# Patient Record
Sex: MALE | Race: WHITE | NOT HISPANIC OR LATINO
[De-identification: names, ages, dates, MRNs, and addresses within clinical notes are randomized per-mention and may not be internally consistent; named-entity substitution may affect disease eponyms.]

---

## 2018-01-29 ENCOUNTER — APPOINTMENT (OUTPATIENT)
Dept: HEART AND VASCULAR | Facility: CLINIC | Age: 43
End: 2018-01-29
Payer: COMMERCIAL

## 2018-01-29 VITALS
HEART RATE: 46 BPM | DIASTOLIC BLOOD PRESSURE: 61 MMHG | HEIGHT: 70 IN | SYSTOLIC BLOOD PRESSURE: 121 MMHG | WEIGHT: 230 LBS | BODY MASS INDEX: 32.93 KG/M2

## 2018-01-29 PROCEDURE — 93000 ELECTROCARDIOGRAM COMPLETE: CPT

## 2018-01-29 PROCEDURE — 99214 OFFICE O/P EST MOD 30 MIN: CPT | Mod: 25

## 2018-01-29 RX ORDER — ALBUTEROL SULFATE 90 UG/1
108 (90 BASE) AEROSOL, METERED RESPIRATORY (INHALATION)
Qty: 18 | Refills: 0 | Status: COMPLETED | COMMUNITY
Start: 2017-08-09

## 2018-01-29 RX ORDER — NAPROXEN 500 MG/1
500 TABLET, DELAYED RELEASE ORAL
Qty: 60 | Refills: 0 | Status: COMPLETED | COMMUNITY
Start: 2017-01-11

## 2018-01-29 RX ORDER — NAPROXEN SODIUM 220 MG
TABLET ORAL
Refills: 0 | Status: ACTIVE | COMMUNITY

## 2018-01-29 RX ORDER — CYCLOBENZAPRINE HYDROCHLORIDE 10 MG/1
10 TABLET, FILM COATED ORAL
Qty: 10 | Refills: 0 | Status: COMPLETED | COMMUNITY
Start: 2017-09-02

## 2018-01-29 RX ORDER — SULFAMETHOXAZOLE AND TRIMETHOPRIM 800; 160 MG/1; MG/1
800-160 TABLET ORAL
Qty: 28 | Refills: 0 | Status: COMPLETED | COMMUNITY
Start: 2017-08-10

## 2018-01-29 RX ORDER — MOMETASONE FUROATE MONOHYDRATE 50 UG/1
SPRAY, METERED NASAL
Refills: 0 | Status: ACTIVE | COMMUNITY

## 2018-01-29 RX ORDER — CLINDAMYCIN PHOSPHATE 10 MG/ML
1 LOTION TOPICAL
Qty: 60 | Refills: 0 | Status: COMPLETED | COMMUNITY
Start: 2017-01-08

## 2018-01-29 RX ORDER — ACETAMINOPHEN AND CODEINE 300; 30 MG/1; MG/1
300-30 TABLET ORAL
Qty: 20 | Refills: 0 | Status: COMPLETED | COMMUNITY
Start: 2017-09-02

## 2018-01-29 RX ORDER — TADALAFIL 10 MG/1
10 TABLET, FILM COATED ORAL
Qty: 5 | Refills: 0 | Status: COMPLETED | COMMUNITY
Start: 2017-12-14

## 2018-01-29 RX ORDER — CYCLOBENZAPRINE HYDROCHLORIDE 5 MG/1
5 TABLET, FILM COATED ORAL
Qty: 30 | Refills: 0 | Status: COMPLETED | COMMUNITY
Start: 2017-06-02

## 2018-01-29 RX ORDER — TADALAFIL 5 MG/1
5 TABLET, FILM COATED ORAL
Qty: 5 | Refills: 0 | Status: ACTIVE | COMMUNITY
Start: 2016-12-08

## 2018-05-21 ENCOUNTER — APPOINTMENT (OUTPATIENT)
Dept: HEART AND VASCULAR | Facility: CLINIC | Age: 43
End: 2018-05-21
Payer: COMMERCIAL

## 2018-05-21 VITALS
DIASTOLIC BLOOD PRESSURE: 56 MMHG | WEIGHT: 230 LBS | HEIGHT: 70 IN | SYSTOLIC BLOOD PRESSURE: 129 MMHG | HEART RATE: 78 BPM | BODY MASS INDEX: 32.93 KG/M2

## 2018-05-21 DIAGNOSIS — I47.1 SUPRAVENTRICULAR TACHYCARDIA: ICD-10-CM

## 2018-05-21 PROCEDURE — 93000 ELECTROCARDIOGRAM COMPLETE: CPT

## 2018-05-21 PROCEDURE — 99214 OFFICE O/P EST MOD 30 MIN: CPT | Mod: 25

## 2018-05-23 PROBLEM — I47.1 ATRIAL TACHYCARDIA, PAROXYSMAL: Status: ACTIVE | Noted: 2018-05-23

## 2018-06-08 ENCOUNTER — MEDICATION RENEWAL (OUTPATIENT)
Age: 43
End: 2018-06-08

## 2018-06-13 ENCOUNTER — MEDICATION RENEWAL (OUTPATIENT)
Age: 43
End: 2018-06-13

## 2018-07-23 ENCOUNTER — APPOINTMENT (OUTPATIENT)
Dept: SLEEP CENTER | Facility: HOSPITAL | Age: 43
End: 2018-07-23

## 2018-07-23 ENCOUNTER — OUTPATIENT (OUTPATIENT)
Dept: OUTPATIENT SERVICES | Facility: HOSPITAL | Age: 43
LOS: 1 days | End: 2018-07-23
Payer: COMMERCIAL

## 2018-07-23 DIAGNOSIS — G47.33 OBSTRUCTIVE SLEEP APNEA (ADULT) (PEDIATRIC): ICD-10-CM

## 2018-07-23 PROCEDURE — 95810 POLYSOM 6/> YRS 4/> PARAM: CPT | Mod: 26,GC

## 2018-07-23 PROCEDURE — 95810 POLYSOM 6/> YRS 4/> PARAM: CPT

## 2018-08-16 ENCOUNTER — APPOINTMENT (OUTPATIENT)
Dept: PULMONOLOGY | Facility: CLINIC | Age: 43
End: 2018-08-16
Payer: COMMERCIAL

## 2018-08-16 VITALS
HEIGHT: 70 IN | HEART RATE: 67 BPM | BODY MASS INDEX: 32.5 KG/M2 | DIASTOLIC BLOOD PRESSURE: 90 MMHG | WEIGHT: 227 LBS | RESPIRATION RATE: 12 BRPM | SYSTOLIC BLOOD PRESSURE: 120 MMHG | OXYGEN SATURATION: 98 % | TEMPERATURE: 98.76 F

## 2018-08-16 DIAGNOSIS — M17.10 UNILATERAL PRIMARY OSTEOARTHRITIS, UNSPECIFIED KNEE: ICD-10-CM

## 2018-08-16 PROCEDURE — 99243 OFF/OP CNSLTJ NEW/EST LOW 30: CPT | Mod: 25

## 2018-08-16 PROCEDURE — 94010 BREATHING CAPACITY TEST: CPT

## 2018-11-05 ENCOUNTER — APPOINTMENT (OUTPATIENT)
Dept: SLEEP CENTER | Facility: HOME HEALTH | Age: 43
End: 2018-11-05
Payer: COMMERCIAL

## 2018-11-05 ENCOUNTER — OUTPATIENT (OUTPATIENT)
Dept: OUTPATIENT SERVICES | Facility: HOSPITAL | Age: 43
LOS: 1 days | End: 2018-11-05
Payer: COMMERCIAL

## 2018-11-05 PROCEDURE — 95800 SLP STDY UNATTENDED: CPT

## 2018-11-12 DIAGNOSIS — G47.33 OBSTRUCTIVE SLEEP APNEA (ADULT) (PEDIATRIC): ICD-10-CM

## 2018-11-13 ENCOUNTER — APPOINTMENT (OUTPATIENT)
Dept: PULMONOLOGY | Facility: CLINIC | Age: 43
End: 2018-11-13
Payer: COMMERCIAL

## 2018-11-13 VITALS
DIASTOLIC BLOOD PRESSURE: 90 MMHG | OXYGEN SATURATION: 98 % | TEMPERATURE: 98.7 F | HEART RATE: 67 BPM | HEIGHT: 70 IN | WEIGHT: 231 LBS | SYSTOLIC BLOOD PRESSURE: 132 MMHG | BODY MASS INDEX: 33.07 KG/M2

## 2018-11-13 PROCEDURE — 99215 OFFICE O/P EST HI 40 MIN: CPT

## 2018-11-13 RX ORDER — NYSTATIN 100000 U/G
100000 OINTMENT TOPICAL
Qty: 30 | Refills: 0 | Status: COMPLETED | COMMUNITY
Start: 2018-06-19

## 2019-03-05 ENCOUNTER — APPOINTMENT (OUTPATIENT)
Dept: PULMONOLOGY | Facility: CLINIC | Age: 44
End: 2019-03-05

## 2019-11-18 ENCOUNTER — RX RENEWAL (OUTPATIENT)
Age: 44
End: 2019-11-18

## 2019-11-25 ENCOUNTER — NON-APPOINTMENT (OUTPATIENT)
Age: 44
End: 2019-11-25

## 2019-11-25 ENCOUNTER — APPOINTMENT (OUTPATIENT)
Dept: HEART AND VASCULAR | Facility: CLINIC | Age: 44
End: 2019-11-25
Payer: COMMERCIAL

## 2019-11-25 VITALS
DIASTOLIC BLOOD PRESSURE: 82 MMHG | WEIGHT: 238 LBS | SYSTOLIC BLOOD PRESSURE: 136 MMHG | HEART RATE: 72 BPM | HEIGHT: 70 IN | BODY MASS INDEX: 34.07 KG/M2

## 2019-11-25 PROCEDURE — 99214 OFFICE O/P EST MOD 30 MIN: CPT | Mod: 25

## 2019-11-25 PROCEDURE — 93000 ELECTROCARDIOGRAM COMPLETE: CPT

## 2019-11-25 RX ORDER — MELOXICAM 7.5 MG/1
7.5 TABLET ORAL
Qty: 60 | Refills: 0 | Status: DISCONTINUED | COMMUNITY
Start: 2017-11-03 | End: 2019-11-25

## 2019-11-25 RX ORDER — ZOLPIDEM TARTRATE 10 MG/1
10 TABLET ORAL
Qty: 30 | Refills: 0 | Status: DISCONTINUED | COMMUNITY
Start: 2018-08-21 | End: 2019-11-25

## 2019-11-25 RX ORDER — LORATADINE 5 MG/5 ML
SOLUTION, ORAL ORAL
Refills: 0 | Status: ACTIVE | COMMUNITY

## 2019-11-25 RX ORDER — NORTRIPTYLINE HYDROCHLORIDE 10 MG/1
10 CAPSULE ORAL
Qty: 30 | Refills: 0 | Status: DISCONTINUED | COMMUNITY
Start: 2017-12-28 | End: 2019-11-25

## 2019-11-25 RX ORDER — SERTRALINE 25 MG/1
25 TABLET, FILM COATED ORAL
Qty: 30 | Refills: 0 | Status: DISCONTINUED | COMMUNITY
Start: 2018-10-30 | End: 2019-11-25

## 2019-11-25 RX ORDER — ASPIRIN 325 MG/1
325 TABLET, FILM COATED ORAL
Refills: 0 | Status: ACTIVE | COMMUNITY

## 2019-11-25 RX ORDER — METOPROLOL SUCCINATE 50 MG/1
50 TABLET, EXTENDED RELEASE ORAL
Qty: 30 | Refills: 0 | Status: DISCONTINUED | OUTPATIENT
Start: 2017-11-24 | End: 2019-11-25

## 2019-11-25 RX ORDER — CHLORHEXIDINE GLUCONATE 4 %
LIQUID (ML) TOPICAL
Refills: 0 | Status: ACTIVE | COMMUNITY

## 2019-11-25 NOTE — PHYSICAL EXAM
[General Appearance - Well Developed] : well developed [Normal Appearance] : normal appearance [Well Groomed] : well groomed [General Appearance - Well Nourished] : well nourished [No Deformities] : no deformities [General Appearance - In No Acute Distress] : no acute distress [Normal Conjunctiva] : the conjunctiva exhibited no abnormalities [Normal Oral Mucosa] : normal oral mucosa [No Oral Pallor] : no oral pallor [Eyelids - No Xanthelasma] : the eyelids demonstrated no xanthelasmas [Normal Jugular Venous A Waves Present] : normal jugular venous A waves present [No Oral Cyanosis] : no oral cyanosis [Normal Jugular Venous V Waves Present] : normal jugular venous V waves present [No Jugular Venous Garcia A Waves] : no jugular venous garcia A waves [Exaggerated Use Of Accessory Muscles For Inspiration] : no accessory muscle use [Respiration, Rhythm And Depth] : normal respiratory rhythm and effort [Auscultation Breath Sounds / Voice Sounds] : lungs were clear to auscultation bilaterally [Heart Rate And Rhythm] : heart rate and rhythm were normal [Heart Sounds] : normal S1 and S2 [Abdomen Soft] : soft [Murmurs] : no murmurs present [Abnormal Walk] : normal gait [Abdomen Tenderness] : non-tender [Abdomen Mass (___ Cm)] : no abdominal mass palpated [Gait - Sufficient For Exercise Testing] : the gait was sufficient for exercise testing [Nail Clubbing] : no clubbing of the fingernails [Cyanosis, Localized] : no localized cyanosis [Skin Turgor] : normal skin turgor [] : no ischemic changes [Skin Color & Pigmentation] : normal skin color and pigmentation [No Skin Ulcers] : no skin ulcer [No Venous Stasis] : no venous stasis [Skin Lesions] : no skin lesions [Oriented To Time, Place, And Person] : oriented to person, place, and time [No Xanthoma] : no  xanthoma was observed [Affect] : the affect was normal [Mood] : the mood was normal [No Anxiety] : not feeling anxious

## 2019-11-26 ENCOUNTER — RX RENEWAL (OUTPATIENT)
Age: 44
End: 2019-11-26

## 2019-11-27 ENCOUNTER — RX RENEWAL (OUTPATIENT)
Age: 44
End: 2019-11-27

## 2019-11-27 NOTE — DISCUSSION/SUMMARY
[FreeTextEntry1] : Mr. Rene is a 43 year-old gentleman with palpitations and PAT on prior ambulatory telemetry.  EKG today significant for NSR.  Palpitations have been well controlled with Metoprolol XL 50 mg. However, given complaint of side effects, he is advised to trial Diltiazem 240 mg daily.   There are no contraindications from an Electrophysiology perspective to his upcoming shoulder surgery.  Referred to general cardiology for clearance prior to surgery.  He will return for follow-up in 6 months or sooner as needed and knows to call with any questions or concerns in the interim.\par \par

## 2019-11-27 NOTE — HISTORY OF PRESENT ILLNESS
[FreeTextEntry1] : Mr. Rene is a pleasant 43 year-old gentleman with a past medical history significant for Asthma, low testosterone and palpitations who presents for follow-up today.  \par \par He reports onset of palpitations ~2014 which have been brief in nature.  He had a sustained episode of palpitations in May 2016 with associated chest discomfort and SOB.  He was admitted to [facility] and was DANIELLA, TTE showed EF 55-60%, mild LA enlargement, minimal MR. ETT significant for 84% MPHR, no evidence for ischemia. He completed a 30 day event monitor (5/27/16 - 7/2/16) with Dr. Church significant for SR/ST, PACs, and PAT. Full disclosure of atrial runs not available. \par \par Sleep study 11/2018 negative for IVÁN\par \par He notes breakthrough palpitations when he doesn't take Metoprolol.  Often begin when he lays down to sleep; lasts up to 30 minutes after he remembers to take Metoprolol.  No associated CP, SOB, dizziness, near syncope. He does not feel limited in exertional capacity. He does, however, complain of impotence and hair loss.  \par \par He is requesting clearance for rotator cuff surgery 2/2019. 
Follow up with pediatrician in 1-2 days

## 2019-12-25 ENCOUNTER — APPOINTMENT (OUTPATIENT)
Dept: HEART AND VASCULAR | Facility: CLINIC | Age: 44
End: 2019-12-25
Payer: COMMERCIAL

## 2019-12-25 PROCEDURE — 0298T: CPT

## 2020-01-21 ENCOUNTER — CHART COPY (OUTPATIENT)
Age: 45
End: 2020-01-21

## 2020-01-21 ENCOUNTER — APPOINTMENT (OUTPATIENT)
Dept: PULMONOLOGY | Facility: CLINIC | Age: 45
End: 2020-01-21
Payer: COMMERCIAL

## 2020-01-21 ENCOUNTER — RESULT REVIEW (OUTPATIENT)
Age: 45
End: 2020-01-21

## 2020-01-21 ENCOUNTER — MESSAGE (OUTPATIENT)
Age: 45
End: 2020-01-21

## 2020-01-21 VITALS
BODY MASS INDEX: 33.36 KG/M2 | SYSTOLIC BLOOD PRESSURE: 120 MMHG | HEIGHT: 70 IN | HEART RATE: 92 BPM | TEMPERATURE: 98.4 F | OXYGEN SATURATION: 96 % | WEIGHT: 233 LBS | DIASTOLIC BLOOD PRESSURE: 80 MMHG

## 2020-01-21 DIAGNOSIS — G47.33 OBSTRUCTIVE SLEEP APNEA (ADULT) (PEDIATRIC): ICD-10-CM

## 2020-01-21 DIAGNOSIS — R00.2 PALPITATIONS: ICD-10-CM

## 2020-01-21 DIAGNOSIS — Z01.818 ENCOUNTER FOR OTHER PREPROCEDURAL EXAMINATION: ICD-10-CM

## 2020-01-21 PROCEDURE — 99215 OFFICE O/P EST HI 40 MIN: CPT | Mod: 25

## 2020-01-21 PROCEDURE — 95012 NITRIC OXIDE EXP GAS DETER: CPT

## 2020-01-21 PROCEDURE — 94060 EVALUATION OF WHEEZING: CPT

## 2020-01-23 NOTE — REVIEW OF SYSTEMS
[Nasal Congestion] : nasal congestion [As Noted in HPI] : as noted in HPI [Negative] : Endocrine [Cough] : no cough [Dyspnea] : no dyspnea [Chest Tightness] : no chest tightness [Wheezing] : no wheezing [FreeTextEntry9] : paroxysmal atrial tachycardia

## 2020-01-23 NOTE — PROCEDURE
[FreeTextEntry1] : Spirometry and NIOX 1/21/20:\par FVC: 5.49 L (112%) --> 5.52 L (113%)\par FEV1: 3.84 L (96%) --> 4.00 L (100%)\par FEV1/FVC: 70% --> 73%\par QMJ82-33%: 2.56 L/s (62%) --> 2.94 L/s (71%)\par FeNO: 22 ppb\par Mild obstructive defect. Normal FeNO.\par \par Home sleep test 11/5/18: slept 6 hours, AHI 4.7, min SpO2 91%\par \par Andrez 8/16/18:\par FVC: 5.63 L (112%)\par FEV1: 4.28 L (104%)\par FEV1/FVC: 76%\par UGA08-33%: 3.59 L/s (84%)\par No obstruction. FVC normal, restriction unlikely.

## 2020-01-23 NOTE — HISTORY OF PRESENT ILLNESS
[Cough] : denies coughing [Chest Pain Or Discomfort] : denies chest pain [Fever] : denies fever [Difficulty Breathing During Exertion] : stable dyspnea on exertion [Feelings Of Weakness On Exertion] : stable exercise intolerance [FreeTextEntry1] : Pt is 45 yo M with PMH asthma, low testosterone, paroxymal atrial tachycardia on metoprolol. \par \par Referred by Dr. Evelyn Baird for evaluation of IVÁN as pt has noted headaches, daytime fatigue, and impotence.  Treatment of IVÁN if present will likely lessen arrhythmia burden. \par \par Initial intake 8/16/18: Pt has never had sleep testing done in the past. Occasionally will wake up with headaches. Has difficulty with sleep initiation.  Wakes up a few times each night.  He does snore at night. Does not wake feeling refreshed. He avoid napping but does have daytime somnolence. He sleeps roughly 3-4 hours per night. \par He states 2-3 times total in the past few years he has "acid indigestion" and wakes up choking and not able to breathe. \par [place] sleepiness score today: 7 (average sleepiness) \par \par Asthma diagnosed in his late teens. Maintained on Advair 250-50 1 puff once daily; has been on this inhaler for "a couple years."  Was on other inhalers prior to that.  Also on Nasonex. Uses albuterol inhaler only rarely, more when he gets sick or when he works out.  He may use nebs and Spiriva when he gets sick. He develops bronchitis about 3-4 times per year. \par He is allergic to eggs and felt poorly after getting a flu shot in the past, perhaps 10 or more years. \par ****\par \par 1/21/20:\par He continues on Advair 250-50 1 puff once daily. \par He feels that he can go up 10 flights of stairs before getting winded at a normal pace. \par Will get short of breath and chest tightness almost immediately if he runs. \par He feels asthma is stable as compared to his last visit. \par He may use albuterol about 2 times per month. \par No hospitalizations or ED visits for asthma in several years. \par He falls asleep around 1:30 or 2 am and wakes 6 am during the week; this is due primarily to his schedule, not difficulty with sleep maintenance or initiation. \par Continues on Nasonex.\par Denies fever, chest pain, cough. \par He received flublok in 10/2019.

## 2020-01-23 NOTE — PHYSICAL EXAM
[General Appearance - Well Developed] : well developed [Normal Appearance] : normal appearance [General Appearance - In No Acute Distress] : no acute distress [Well Groomed] : well groomed [IV] : IV [Neck Circumference: ___] : neck circumference is [unfilled] [Heart Rate And Rhythm] : heart rate and rhythm were normal [Heart Sounds] : normal S1 and S2 [Murmurs] : no murmurs present [Abnormal Walk] : normal gait [Nail Clubbing] : no clubbing of the fingernails [No Focal Deficits] : no focal deficits [Oriented To Time, Place, And Person] : oriented to person, place, and time [Impaired Insight] : insight and judgment were intact [Memory Recent] : recent memory was not impaired [Affect] : the affect was normal [Respiration, Rhythm And Depth] : normal respiratory rhythm and effort [Exaggerated Use Of Accessory Muscles For Inspiration] : no accessory muscle use [Auscultation Breath Sounds / Voice Sounds] : lungs were clear to auscultation bilaterally [Abdomen Soft] : soft [Abdomen Tenderness] : non-tender [Gait - Sufficient For Exercise Testing] : the gait was sufficient for exercise testing [Skin Color & Pigmentation] : normal skin color and pigmentation [] : no rash [FreeTextEntry1] : mild discoloration of the nails, mild palmar erythema [FreeTextEntry2] : no pedal edema

## 2020-01-23 NOTE — ASSESSMENT
[FreeTextEntry1] : 1/21/20\par It was a pleasure to meet Reji in follow up today.   His respiratory issues are summarized:\par \par 1.  Asthma \par Based upon the history, I suspect that Reji has mild intermittent asthma provoked by exercise and when his allergies are flaring. His symptoms also get significantly worse when he develops a respiratory tract infection.  Currently, he feels his asthma is stable. He is maintained on Advair 250-50 one puff once daily. He rinses out his mouth after each use as instructed. He uses is albuterol inhaler roughly 2 times per month. \par On spirometry today, there is a mild obstructive defect (FEV1/FVC 70%) and his FeNO is normal.  However, it should be noted that his FEV1 today is 440 ccs less than this last spirometry; also his MGY03-52% is approximately 1 L/s less than on his last visit and shows a significant bronchodilator response. For this reason, I would like to have Reji continue his Advair at 1 puff once daily (we will make specific perioperative recommendations below). We will reassess his spirometry and symptoms at his next visit. \par \par 2.   At risk for sleep apnea \par Reji has a collar size of 17", a Mallampati score of 4, and a BMI of approximately 33.  A home sleep study was performed 11/5/18.  Reji's AHI was 4.7 (normal) and his minimum SpO2 was 91% (i.e., normal study). \par \par Today, his [place] sleepiness score is 4 (normal sleepiness) and is the same as his prior visit. His weight is fairly stable as compared to his last visit (231 lb in 11/2018; 233 lb today). He denies morning headaches and drowsiness during the day. He does only sleep 4 or so hours per night on the weekdays, but states this is due to his schedule, not due to any sleep disturbances.   \par \par As he does not have any increase in symptoms and his weight has not changed significantly since his last sleep study was performed, there is no need to re-test for IVÁN at this time. \par \par 3. Recurrent nasal sinus infections. \par It is possible that the patient's deviated nasal septum may be contributing to the recurrent nasal sinus infections. He had been referred to an ENT at his last visit.\par Today, he complains of allergies and postnasal drip. We have advised that he use saline sinus rinse BID in addition to his Nasonex. \par \par 4. Surgical clearance for left rotator cuff tear\par His surgery is scheduled for 2/10/20 with Dr. Ashlyn Jerome at hospitals. \par As noted above, he was evaluated for possible IVÁN and his home sleep study in 11/2018 was negative. On spirometry today, the FEV1 is less as is his UHV35-77%. \par \par Patient is cleared from the pulmonary perspective with the following recommendations: \par -We have requested that he increase the Advair 250-50 to 1 puff BID for the 2 weeks before his surgery and 2 week post-op.  Then, if stable, he will decrease back down to his current dosage of 1 puff once daily until we see him again in clinic. \par -While his sleep study was negative for IVÁN, if there is any sign of upper airways obstruction post- extubation, we recommend that CPAP be started. \par -Intra- and post-operative SpO2 monitoring\par -DVT prophylaxis as per surgical team \par -Incentive spirometry night before the surgery to continue for several days after surgery\par \par \par 4.   Health maintenance \par He is allergic to eggs. He received Flublok (developed from non-egg sources) approximately 10/2019 and is up-to-date for the current flu season. \par There is a mild discoloration of the nails noted on exam today; he is getting a CBC with his PMD later today which will assess his Hgb and hematocrit levels.\par \par Return in 3 months

## 2020-01-23 NOTE — END OF VISIT
[] : Fellow [>50% of Time Spent on Counseling and Coordination of Care for  ___] : Greater than 50% of the encounter time was spent on counseling and coordination of care for [unfilled] [Time Spent: ___ minutes] : I have spent [unfilled] minutes of face to face time with the patient [FreeTextEntry3] : All medical record entries made by the Scribe were at my, Dr. Partha Glover's direction and personally dictated by me.   I have reviewed the chart and agree that the record accurately reflects my personal performance of the history, physical exam, assessment and plan. I have also personally directed, reviewed, and agreed with the chart.\par \par

## 2020-01-23 NOTE — CONSULT LETTER
[Dear  ___] : Dear ~ROBERTO, [( Thank you for referring [unfilled] for consultation for _____ )] : Thank you for referring [unfilled] for consultation for [unfilled] [Consult Letter:] : I had the pleasure of evaluating your patient, [unfilled]. [Please see my note below.] : Please see my note below. [Sincerely,] : Sincerely, [Consult Closing:] : Thank you very much for allowing me to participate in the care of this patient.  If you have any questions, please do not hesitate to contact me. [DrLuz  ___] : Dr. BUCK [FreeTextEntry2] : Dr. Evelyn Baird [FreeTextEntry3] : Partha Glover MD

## 2020-01-23 NOTE — DISCUSSION/SUMMARY
[FreeTextEntry1] : Attending's summary\par 1-21-20\par I concur with exam.   Pertinent findings include:\par -no pallor or icterus\par -MP 4, no oral thrush \par -nasal mucosa is inflamed, nasal mucosa inflamed, no discharge \par -collar size 17, no cervical adenopathy, no JVD at 45 degrees, no hepatojugular reflux \par -good air entry bilaterally, no wheezing, rhonchi or crackles \par -normal S1, S2, no murmurs, rubs, gallops, P2 not loud or split \par -no pedal edema \par -no clubbing, mild discoloration of the nails, mild palmar erythema

## 2020-02-06 ENCOUNTER — EMERGENCY (EMERGENCY)
Facility: HOSPITAL | Age: 45
LOS: 1 days | Discharge: HOME | End: 2020-02-06
Attending: EMERGENCY MEDICINE
Payer: COMMERCIAL

## 2020-02-06 PROCEDURE — 99284 EMERGENCY DEPT VISIT MOD MDM: CPT

## 2020-02-07 VITALS
HEART RATE: 126 BPM | OXYGEN SATURATION: 98 % | WEIGHT: 229.94 LBS | RESPIRATION RATE: 16 BRPM | SYSTOLIC BLOOD PRESSURE: 105 MMHG | DIASTOLIC BLOOD PRESSURE: 75 MMHG | TEMPERATURE: 98 F

## 2020-02-07 VITALS
DIASTOLIC BLOOD PRESSURE: 71 MMHG | SYSTOLIC BLOOD PRESSURE: 113 MMHG | OXYGEN SATURATION: 96 % | RESPIRATION RATE: 17 BRPM | HEART RATE: 107 BPM

## 2020-02-07 PROCEDURE — 99284 EMERGENCY DEPT VISIT MOD MDM: CPT | Mod: 25

## 2020-02-07 PROCEDURE — 96375 TX/PRO/DX INJ NEW DRUG ADDON: CPT

## 2020-02-07 PROCEDURE — 96374 THER/PROPH/DIAG INJ IV PUSH: CPT

## 2020-02-07 RX ORDER — DEXAMETHASONE 0.5 MG/5ML
10 ELIXIR ORAL ONCE
Refills: 0 | Status: DISCONTINUED | OUTPATIENT
Start: 2020-02-07 | End: 2020-02-07

## 2020-02-07 RX ORDER — SODIUM CHLORIDE 9 MG/ML
1000 INJECTION INTRAMUSCULAR; INTRAVENOUS; SUBCUTANEOUS ONCE
Refills: 0 | Status: COMPLETED | OUTPATIENT
Start: 2020-02-07 | End: 2020-02-07

## 2020-02-07 RX ORDER — DIPHENHYDRAMINE HCL 50 MG
50 CAPSULE ORAL ONCE
Refills: 0 | Status: COMPLETED | OUTPATIENT
Start: 2020-02-07 | End: 2020-02-07

## 2020-02-07 RX ORDER — FAMOTIDINE 10 MG/ML
20 INJECTION INTRAVENOUS ONCE
Refills: 0 | Status: COMPLETED | OUTPATIENT
Start: 2020-02-07 | End: 2020-02-07

## 2020-02-07 RX ADMIN — SODIUM CHLORIDE 1000 MILLILITER(S): 9 INJECTION INTRAMUSCULAR; INTRAVENOUS; SUBCUTANEOUS at 03:00

## 2020-02-07 RX ADMIN — Medication 50 MILLIGRAM(S): at 00:29

## 2020-02-07 RX ADMIN — FAMOTIDINE 20 MILLIGRAM(S): 10 INJECTION INTRAVENOUS at 00:29

## 2020-02-07 NOTE — ED PROVIDER NOTE - NSFOLLOWUPINSTRUCTIONS_ED_ALL_ED_FT
GENERAL ALLERGIC REACTION - AfterCare(R) Instructions(ER/ED)     General Allergic Reaction    WHAT YOU NEED TO KNOW:    An allergic reaction is your body's response to an allergen. Allergens include medicines, food, insect stings, animal dander, mold, latex, chemicals, and dust mites. Pollen from trees, grass, and weeds can also cause an allergic reaction. An allergic reaction can range from mild to severe.    DISCHARGE INSTRUCTIONS:    Call 911 for signs or symptoms of anaphylaxis, such as trouble breathing, swelling in your mouth or throat, or wheezing. You may also have itching, a rash, hives, or feel like you are going to faint.    Return to the emergency department if:     You have a skin rash, hives, swelling, or itching that is starting to get worse.      Your throat tightens, or your lips or tongue swell.      You have trouble swallowing or speaking.      You have worsening nausea, diarrhea, or abdominal cramps, or you are vomiting.      You have chest pain or tightness.    Contact your healthcare provider if:     You have questions or concerns about your condition or care.        Medicines: You may need any of the following:     Medicines may be given to relieve certain allergy symptoms such as itching, sneezing, and swelling. You may take them as a pill or use drops in your nose or eyes. Topical treatments may be given to put directly on your skin to help decrease itching or swelling.      Epinephrine may be prescribed if you are at risk for anaphylaxis. This is a severe allergic reaction that can be life-threatening. Your healthcare provider will tell you if you need to keep epinephrine with you. You will be taught when and how to use it.      Take your medicine as directed. Contact your healthcare provider if you think your medicine is not helping or if you have side effects. Tell him of her if you are allergic to any medicine. Keep a list of the medicines, vitamins, and herbs you take. Include the amounts, and when and why you take them. Bring the list or the pill bottles to follow-up visits. Carry your medicine list with you in case of an emergency.    Follow up with your healthcare provider as directed: Write down your questions so you remember to ask them during your visits.     Manage your symptoms:     Avoid allergens. You may need to have allergy testing with your healthcare provider or a specialist to find your allergens.      Use cold compresses on your skin or eyes. This will help soothe skin or eyes affected by the allergic reaction. You can make a cold compress by soaking a washcloth in cool water. Wring out the extra water before you apply the washcloth.      Rinse your nasal passages with a saline solution. Daily rinsing may help clear allergens out of your nose. Use distilled water if possible. You can also boil tap water and then let it cool before you use it. Do not use tap water without boiling it first.      Do not smoke. Nicotine and other chemicals in cigarettes and cigars can make an allergic reaction worse, and can also cause lung damage. Ask your healthcare provider for information if you currently smoke and need help to quit. E-cigarettes or smokeless tobacco still contain nicotine. Talk to your healthcare provider before you use these products.          © Copyright [email or web address] 2020       back to top                      © Copyright [email or web address] 2020

## 2020-02-07 NOTE — ED ADULT NURSE NOTE - OBJECTIVE STATEMENT
The patient presents with flushed skin, redness.  No hives, no wheezing, no angio edema, No throat edema.  He states that he took Augmentin and started feeling the symptoms afterward.

## 2020-02-07 NOTE — ED PROVIDER NOTE - PROGRESS NOTE DETAILS
VS and symptoms improved. No wheezing, no throat swelling, well appearing. Has epi pen at home. Declined steroids b/c he has ortho surgery scheduled next week. No suspicion for anaphylaxis at this time. Will dc. Discussed indications for patient return to ED. Patient understood.

## 2020-02-07 NOTE — ED PROVIDER NOTE - PHYSICAL EXAMINATION
GENERAL: well appearing, no acute distress   HEAD: atraumatic   EYES: EOMI, pink conjunctiva   ENT: moist oral mucosa; no tongue throat or uvula swelling   CARDIAC: tachycardia, no edema, distal pulses present   RESPIRATORY: lungs clear, no increased work of breathing   GASTROINTESTINAL: no abdominal tenderness, no rebound or guarding, bowel sounds presents  GENITOURINARY: no CVA tenderness   MUSCULOSKELETAL: no deformity   NEUROLOGICAL: AAOx3, spontaneous movement of extremities   SKIN: redness to trunk/face/extremities without discrete hives   PSYCHIATRIC: cooperative  HEME LYMPH: no lymphadenopathy

## 2020-02-07 NOTE — ED PROVIDER NOTE - NS ED ROS FT
CONSTITUTIONAL: no fever, no chills   EYES: no visual changes, no eye pain   ENMT: no nasal congestion, no throat discomfort   CARDIOVASCULAR: no chest pain, no edema, no palpitations   RESPIRATORY: no shortness of breath, no cough   GASTROINTESTINAL: no abdominal pain, no nausea, no vomiting, no diarrhea, no constipation   GENITOURINARY: no dysuria, no frequency  MUSCULOSKELETAL: no joint pains, no myalgias, no back pain   SKIN: +redness   NEUROLOGICAL: no weakness, no headache, no dizziness, no slurred speech, no syncope   PSYCHIATRIC: +anxiety   HEME/LYMPH: no lymphadenopathy      All other ROS negative except as per HPI

## 2020-02-07 NOTE — ED PROVIDER NOTE - OBJECTIVE STATEMENT
44 year old male with PMHx of food allergies presenting with redness to face, trunk, and extremities today. Patient details that symptoms started prior to arrival after the patient took some medication to treat bronchitis. Patient also with anxiety and some throat discomfort. Patient denies any history of anaphylaxis, wheezing, shortness of breath, abd pain, nausea, vomiting, or any other acute complaints. 44 year old male with PMHx of food allergies presenting with redness to face, trunk, and extremities today. Patient details that symptoms started prior to arrival after the patient took some medication to treat bronchitis (mucinex and augmentin). Patient also with anxiety and some throat discomfort. Patient denies any history of anaphylaxis, wheezing, shortness of breath, abd pain, nausea, vomiting, or any other acute complaints.

## 2020-02-07 NOTE — ED PROVIDER NOTE - PATIENT PORTAL LINK FT
You can access the FollowMyHealth Patient Portal offered by [facility] by registering at the following website: [email or web address]. By joining [facility]’s FollowMyHealth portal, you will also be able to view your health information using other applications (apps) compatible with our system.

## 2020-03-26 RX ORDER — TIOTROPIUM BROMIDE 18 UG/1
18 CAPSULE ORAL; RESPIRATORY (INHALATION) DAILY
Qty: 1 | Refills: 1 | Status: ACTIVE | COMMUNITY
Start: 2017-02-21 | End: 1900-01-01

## 2020-09-17 ENCOUNTER — APPOINTMENT (OUTPATIENT)
Dept: PULMONOLOGY | Facility: CLINIC | Age: 45
End: 2020-09-17
Payer: COMMERCIAL

## 2020-09-17 DIAGNOSIS — J32.9 CHRONIC SINUSITIS, UNSPECIFIED: ICD-10-CM

## 2020-09-17 PROCEDURE — 99214 OFFICE O/P EST MOD 30 MIN: CPT | Mod: 95

## 2020-10-08 ENCOUNTER — RX RENEWAL (OUTPATIENT)
Age: 45
End: 2020-10-08

## 2020-10-26 ENCOUNTER — RX RENEWAL (OUTPATIENT)
Age: 45
End: 2020-10-26

## 2020-11-04 NOTE — HISTORY OF PRESENT ILLNESS
[TextBox_4] : Pt is 43 yo M with PMH asthma, low testosterone, paroxymal atrial tachycardia on metoprolol. \par \par Referred by Dr. Evelyn Baird for evaluation of IVÁN as pt has noted headaches, daytime fatigue, and impotence. Treatment of IVÁN if present will likely lessen arrhythmia burden. \par \par Initial intake 8/16/18: Pt has never had sleep testing done in the past. Occasionally will wake up with headaches. Has difficulty with sleep initiation. Wakes up a few times each night. He does snore at night. Does not wake feeling refreshed. He avoid napping but does have daytime somnolence. He sleeps roughly 3-4 hours per night. \par He states 2-3 times total in the past few years he has "acid indigestion" and wakes up choking and not able to breathe. \par [place] sleepiness score today: 7 (average sleepiness) \par \par Asthma diagnosed in his late teens. Maintained on Advair 250-50 1 puff once daily; has been on this inhaler for "a couple years." Was on other inhalers prior to that. Also on Nasonex. Uses albuterol inhaler only rarely, more when he gets sick or when he works out. He may use nebs and Spiriva when he gets sick. He develops bronchitis about 3-4 times per year. \par He is allergic to eggs and felt poorly after getting a flu shot in the past, perhaps 10 or more years. \par ****\par 9-17-20\par Patient was interviewed over the phone for a telehealth visit. \par Not feeling too well recently. breathing hasn't been upto par. started thursday. extremely tired, more than usual. sinuses bothering more than usual. having headache every now and then. he has took some claritin with some improvement.  currently, using advair twice a day. He had a COVID test and was negative. He has noted dust trigger/ changes in temperature/ AC trigger worsen nasal congestion. He denies any fever/chills/greenish nasal discharge.

## 2020-11-04 NOTE — REVIEW OF SYSTEMS
[Fatigue] : fatigue [Nasal Congestion] : nasal congestion [Postnasal Drip] : postnasal drip [Sinus Problems] : sinus problems [Cough] : cough [Sputum] : sputum [Dyspnea] : dyspnea [Nasal Discharge] : nasal discharge [Fever] : no fever [Chills] : no chills [Dry Eyes] : no dry eyes [Orthopnea] : no orthopnea [Syncope] : no syncope [GERD] : no gerd [Abdominal Pain] : no abdominal pain [Diarrhea] : no diarrhea [Arthralgias] : no arthralgias [Myalgias] : no myalgias [Anemia] : no anemia [Depression] : no depression [Anxiety] : no anxiety

## 2020-11-04 NOTE — ASSESSMENT
[FreeTextEntry1] : 9/17/2020\par It was a pleasure to meet Reji in follow up today via telehealth visit. His respiratory issues are summarized:\par \par 1.  Asthma \par Reji appears to have mild intermittent asthma provoked by exercise and when his allergies are flaring. His sinusitis is currently flaring (sinus pressures, post nasal drip, nasal discharge, tenderness on self palpation) and asthma is worsened. \par Triggers include: dust, temp changes. \par He had increased advair 250-50 from one puff once daily to one puff twice daily. He rinses out his mouth after each use as instructed. He uses is albuterol inhaler roughly 2 times per month. \par On prior spirometry, there is a mild obstructive defect (FEV1/FVC 70%) and his FeNO is normal. \par PLAN:  agree with increased Advair dose to BID. \par \par 2.   At risk for sleep apnea \par Reji has a collar size of 17", a Mallampati score of 4, and a BMI of approximately 33.  A home sleep study was performed 11/5/18.  Reji's AHI was 4.7 (normal) and his minimum SpO2 was 91% (i.e., normal study). \par As he does not have any increase in symptoms and his weight has not changed significantly since his last sleep study was performed, there is no need to re-test for IVÁN at this time. \par He does describe being fatigued and incomplete sleep due to sinus issues. He has been advised to take Rem fresh.  \par \par 3. Chronic sinusitis ? \par Patient with recurrent episodes of sinusitis and believe he may chronic sinusitis which causes worsening of asthma. It is possible that the patient's deviated nasal septum may be contributing to the recurrent nasal sinus infections. He hasn't seen ENT doctor in at 10-15 yrs. \par PLAN: \par - We recommened Sinu-pulse to the patient followed by flonase 2 puffs BID followed by azelastine 2 puffs BID and cont claritin x 10 days. no indication for antibiotics at this time. \par - patient is to call back in 10 days to report on his progress. If he is not better, will consider azithromycin 250 mg Qday x 10 days. \par \par \par 3.   Health maintenance \par He is allergic to eggs. He received Flublok (developed from non-egg sources) approximately 10/2019 and is up-to-date for the current flu season. \par \par \par Repeat appointment in 3 months.   \par \par The patient was s/e/d with Dr Glover.

## 2020-11-04 NOTE — CONSULT LETTER
[Dear  ___] : Dear ~ROBERTO, [Consult Letter:] : I had the pleasure of evaluating your patient, [unfilled]. [( Thank you for referring [unfilled] for consultation for _____ )] : Thank you for referring [unfilled] for consultation for [unfilled] [Please see my note below.] : Please see my note below. [Consult Closing:] : Thank you very much for allowing me to participate in the care of this patient.  If you have any questions, please do not hesitate to contact me. [Sincerely,] : Sincerely, [DrLuz  ___] : Dr. BUCK [FreeTextEntry2] : Dr. Evelyn Baird [FreeTextEntry3] : Partha Glover MD

## 2020-11-04 NOTE — PROCEDURE
[FreeTextEntry1] : Spirometry and NIOX 1/21/20:\par FVC: 5.49 L (112%) --> 5.52 L (113%)\par FEV1: 3.84 L (96%) --> 4.00 L (100%)\par FEV1/FVC: 70% --> 73%\par [id number]%: 2.56 L/s (62%) --> 2.94 L/s (71%)\par FeNO: 22 ppb\par Mild obstructive defect. Normal FeNO.\par \par Home sleep test 11/5/18: slept 6 hours, AHI 4.7, min SpO2 91%\par \par Andrez 8/16/18:\par FVC: 5.63 L (112%)\par FEV1: 4.28 L (104%)\par FEV1/FVC: 76%\par [id number]%: 3.59 L/s (84%)\par No obstruction. FVC normal, restriction unlikely.

## 2021-02-23 ENCOUNTER — EMERGENCY (EMERGENCY)
Facility: HOSPITAL | Age: 46
LOS: 1 days | Discharge: HOME | End: 2021-02-23
Attending: EMERGENCY MEDICINE | Admitting: EMERGENCY MEDICINE
Payer: COMMERCIAL

## 2021-02-23 VITALS
DIASTOLIC BLOOD PRESSURE: 80 MMHG | TEMPERATURE: 98 F | HEART RATE: 69 BPM | OXYGEN SATURATION: 96 % | RESPIRATION RATE: 16 BRPM | SYSTOLIC BLOOD PRESSURE: 127 MMHG

## 2021-02-23 VITALS
SYSTOLIC BLOOD PRESSURE: 160 MMHG | DIASTOLIC BLOOD PRESSURE: 95 MMHG | RESPIRATION RATE: 16 BRPM | HEART RATE: 75 BPM | HEIGHT: 70 IN | TEMPERATURE: 98 F | WEIGHT: 225.09 LBS | OXYGEN SATURATION: 98 %

## 2021-02-23 DIAGNOSIS — K64.5 PERIANAL VENOUS THROMBOSIS: ICD-10-CM

## 2021-02-23 DIAGNOSIS — Z88.8 ALLERGY STATUS TO OTHER DRUGS, MEDICAMENTS AND BIOLOGICAL SUBSTANCES STATUS: ICD-10-CM

## 2021-02-23 DIAGNOSIS — Z79.899 OTHER LONG TERM (CURRENT) DRUG THERAPY: ICD-10-CM

## 2021-02-23 DIAGNOSIS — Z91.012 ALLERGY TO EGGS: ICD-10-CM

## 2021-02-23 DIAGNOSIS — Z79.51 LONG TERM (CURRENT) USE OF INHALED STEROIDS: ICD-10-CM

## 2021-02-23 DIAGNOSIS — K62.5 HEMORRHAGE OF ANUS AND RECTUM: ICD-10-CM

## 2021-02-23 DIAGNOSIS — Z91.013 ALLERGY TO SEAFOOD: ICD-10-CM

## 2021-02-23 PROCEDURE — 99282 EMERGENCY DEPT VISIT SF MDM: CPT

## 2021-02-23 RX ORDER — DOCUSATE SODIUM 100 MG
1 CAPSULE ORAL
Qty: 14 | Refills: 0
Start: 2021-02-23 | End: 2021-03-08

## 2021-02-23 NOTE — ED PROVIDER NOTE - NSFOLLOWUPINSTRUCTIONS_ED_ALL_ED_FT
Perform Sitz baths as described daily.  Monitor bleeding.  If you notice significant increased bleeding, pain, lightheadedness or dizziness return to ER.  Follow up with GI as scheduled by our referrals coordinator.      WHAT YOU NEED TO KNOW:    Hemorrhoids are swollen blood vessels inside your rectum (internal hemorrhoids) or on your anus (external hemorrhoids). Sometimes a hemorrhoid may prolapse. This means it extends out of your anus.    DISCHARGE INSTRUCTIONS:    Return to the emergency department if:   •You have severe pain in your rectum or around your anus.      •You have severe pain in your abdomen and you are vomiting.       •You have bleeding from your anus that soaks through your underwear.       Contact your healthcare provider if:   •You have frequent and painful bowel movements.      •Your hemorrhoid looks or feels more swollen than usual.       •You do not have a bowel movement for 2 days or more.       •You see or feel tissue coming through your anus.       •You have questions or concerns about your condition or care.      Medicines: You may need any of the following:   •A pad, cream, or ointment can help decrease pain, swelling, and itching.       •Stool softeners help treat or prevent constipation.       •NSAIDs, such as ibuprofen, help decrease swelling, pain, and fever. NSAIDs can cause stomach bleeding or kidney problems in certain people. If you take blood thinner medicine, always ask your healthcare provider if NSAIDs are safe for you. Always read the medicine label and follow directions.      •Take your medicine as directed. Contact your healthcare provider if you think your medicine is not helping or if you have side effects. Tell him or her if you are allergic to any medicine. Keep a list of the medicines, vitamins, and herbs you take. Include the amounts, and when and why you take them. Bring the list or the pill bottles to follow-up visits. Carry your medicine list with you in case of an emergency.      Manage your symptoms:   •Apply ice on your anus for 15 to 20 minutes every hour or as directed. Use an ice pack, or put crushed ice in a plastic bag. Cover it with a towel before you apply it to your anus. Ice helps prevent tissue damage and decreases swelling and pain.      •Take a sitz bath. Fill a bathtub with 4 to 6 inches of warm water. You may also use a sitz bath pan that fits inside a toilet bowl. Sit in the sitz bath for 15 minutes. Do this 3 times a day, and after each bowel movement. The warm water can help decrease pain and swelling.       •Keep your anal area clean. Gently wash the area with warm water daily. Soap may irritate the area. After a bowel movement, wipe with moist towelettes or wet toilet paper. Dry toilet paper can irritate the area.       Prevent hemorrhoids:   •Do not strain to have a bowel movement. Do not sit on the toilet too long. These actions can increase pressure on the tissues in your rectum and anus.       •Drink plenty of liquids. Liquids can help prevent constipation. Ask how much liquid to drink each day and which liquids are best for you.       •Eat a variety of high-fiber foods. Examples include fruits, vegetables, and whole grains. Ask your healthcare provider how much fiber you need each day. You may need to take a fiber supplement.              •Exercise as directed. Exercise, such as walking, may make it easier to have a bowel movement. Ask your healthcare provider to help you create an exercise plan.       •Do not have anal sex. Anal sex can weaken the skin around your rectum and anus.       •Avoid heavy lifting. This can cause straining and increase your risk for another hemorrhoid.       Follow up with your healthcare provider as directed: Write down your questions so you remember to ask them during your visits

## 2021-02-23 NOTE — ED PROVIDER NOTE - CLINICAL SUMMARY MEDICAL DECISION MAKING FREE TEXT BOX
Rectal bleeding from thrombosed hemorrhoid.  No rectal bleeding.  Pt stable.  Plan sitz baths, stool softeners and outpt fu.

## 2021-02-23 NOTE — ED ADULT TRIAGE NOTE - CHIEF COMPLAINT QUOTE
Pt reports rectal bleeding that began at 10:15 AM this morning. Pt w/ hx of hemorrhoids. Denies abdominal pain, NVD, fever, chills. hx of afib.

## 2021-02-23 NOTE — ED ADULT NURSE NOTE - OBJECTIVE STATEMENT
Patient presents to the ED complaining of bleeding from his hemoriods today. Denies any dizziness or weakness. No fever.

## 2021-02-23 NOTE — ED PROVIDER NOTE - PATIENT PORTAL LINK FT
You can access the FollowMyHealth Patient Portal offered by [facility] by registering at the following website: [email or web address]. By joining [employer]’s FollowMyHealth portal, you will also be able to view your health information using other applications (apps) compatible with our system.

## 2021-02-23 NOTE — ED PROVIDER NOTE - OBJECTIVE STATEMENT
44 yo M with hx of Afib not on AC (supposed to be on ASA but not) presenting with intermittent rectal bleeding with occasional small clots x 2 days.  Denies lightheadedness, ab pain.  + rectal discomfort and feels hemorrhoid present.  Occasional constipation.

## 2021-03-16 ENCOUNTER — APPOINTMENT (OUTPATIENT)
Age: 46
End: 2021-03-16

## 2021-07-09 ENCOUNTER — RX RENEWAL (OUTPATIENT)
Age: 46
End: 2021-07-09

## 2021-08-02 ENCOUNTER — RX RENEWAL (OUTPATIENT)
Age: 46
End: 2021-08-02

## 2021-08-10 ENCOUNTER — RX RENEWAL (OUTPATIENT)
Age: 46
End: 2021-08-10

## 2021-08-10 RX ORDER — ALBUTEROL SULFATE 90 UG/1
108 (90 BASE) INHALANT RESPIRATORY (INHALATION)
Qty: 1 | Refills: 2 | Status: ACTIVE | COMMUNITY
Start: 2021-08-10 | End: 1900-01-01

## 2021-09-02 ENCOUNTER — RX RENEWAL (OUTPATIENT)
Age: 46
End: 2021-09-02

## 2021-11-23 ENCOUNTER — RX RENEWAL (OUTPATIENT)
Age: 46
End: 2021-11-23

## 2022-01-29 ENCOUNTER — TRANSCRIPTION ENCOUNTER (OUTPATIENT)
Age: 47
End: 2022-01-29

## 2022-04-01 ENCOUNTER — TRANSCRIPTION ENCOUNTER (OUTPATIENT)
Age: 47
End: 2022-04-01

## 2022-04-01 ENCOUNTER — NON-APPOINTMENT (OUTPATIENT)
Age: 47
End: 2022-04-01

## 2022-05-31 ENCOUNTER — RX RENEWAL (OUTPATIENT)
Age: 47
End: 2022-05-31

## 2022-07-25 ENCOUNTER — APPOINTMENT (OUTPATIENT)
Dept: HEART AND VASCULAR | Facility: CLINIC | Age: 47
End: 2022-07-25

## 2022-07-25 ENCOUNTER — NON-APPOINTMENT (OUTPATIENT)
Age: 47
End: 2022-07-25

## 2022-07-25 VITALS
WEIGHT: 10 LBS | HEART RATE: 92 BPM | BODY MASS INDEX: 1.43 KG/M2 | SYSTOLIC BLOOD PRESSURE: 127 MMHG | HEIGHT: 70 IN | DIASTOLIC BLOOD PRESSURE: 60 MMHG | TEMPERATURE: 97.4 F

## 2022-07-25 PROCEDURE — 99213 OFFICE O/P EST LOW 20 MIN: CPT

## 2022-07-25 PROCEDURE — 93000 ELECTROCARDIOGRAM COMPLETE: CPT

## 2022-07-25 RX ORDER — DILTIAZEM HYDROCHLORIDE 240 MG/1
240 CAPSULE, EXTENDED RELEASE ORAL DAILY
Qty: 90 | Refills: 1 | Status: DISCONTINUED | COMMUNITY
Start: 2019-11-25 | End: 2022-07-25

## 2022-07-25 NOTE — ADDENDUM
[FreeTextEntry1] : I, Evelyn Baird, hereby attest that the medical record entry for this patient accurately reflects signatures/notations that I made on the Date of Service in my capacity as an Attending Physician when I treated/diagnosed the above patient. I do hereby attest that this information is true, accurate and complete to the best of my knowledge.  I was present for the entire visit and supervised the entire visit and made/agree with the plan as outlined.

## 2022-07-25 NOTE — REVIEW OF SYSTEMS
[Palpitations] : palpitations [Negative] : Heme/Lymph [Fever] : no fever [Chills] : no chills [Feeling Fatigued] : not feeling fatigued [SOB] : no shortness of breath [Dyspnea on exertion] : not dyspnea during exertion [Syncope] : no syncope [Cough] : no cough [Wheezing] : no wheezing

## 2022-07-25 NOTE — PHYSICAL EXAM
[General Appearance - Well Developed] : well developed [Normal Appearance] : normal appearance [Well Groomed] : well groomed [General Appearance - Well Nourished] : well nourished [No Deformities] : no deformities [General Appearance - In No Acute Distress] : no acute distress [Normal Conjunctiva] : the conjunctiva exhibited no abnormalities [Normal Oral Mucosa] : normal oral mucosa [Normal Jugular Venous V Waves Present] : normal jugular venous V waves present [] : no respiratory distress [Respiration, Rhythm And Depth] : normal respiratory rhythm and effort [Exaggerated Use Of Accessory Muscles For Inspiration] : no accessory muscle use [Auscultation Breath Sounds / Voice Sounds] : lungs were clear to auscultation bilaterally [Heart Rate And Rhythm] : heart rate and rhythm were normal [Heart Sounds] : normal S1 and S2 [Abnormal Walk] : normal gait [Skin Color & Pigmentation] : normal skin color and pigmentation [Oriented To Time, Place, And Person] : oriented to person, place, and time [Affect] : the affect was normal [Mood] : the mood was normal [No Anxiety] : not feeling anxious [Impaired Insight] : insight and judgment were intact

## 2022-07-25 NOTE — DISCUSSION/SUMMARY
[EKG obtained to assist in diagnosis and management of assessed problem(s)] : EKG obtained to assist in diagnosis and management of assessed problem(s) [FreeTextEntry1] : Mr. Rene is a 46 year-old gentleman with palpitations and PAT on prior ambulatory telemetry.  EKG today shows NSR.  Palpitations have been well controlled when he takes his long acting CCB but if he misses this he needs to take his PRN Metoprolol.  I have asked him to wear a repeat event monitor to assess palpitations.   He will return for follow-up in 3 months or sooner as needed and knows to call with any questions or concerns in the interim.\par \par

## 2022-08-13 ENCOUNTER — APPOINTMENT (OUTPATIENT)
Dept: HEART AND VASCULAR | Facility: CLINIC | Age: 47
End: 2022-08-13

## 2022-08-13 PROCEDURE — 93244 EXT ECG>48HR<7D REV&INTERPJ: CPT

## 2022-09-23 ENCOUNTER — RX RENEWAL (OUTPATIENT)
Age: 47
End: 2022-09-23

## 2022-10-03 ENCOUNTER — APPOINTMENT (OUTPATIENT)
Dept: HEART AND VASCULAR | Facility: CLINIC | Age: 47
End: 2022-10-03

## 2022-10-03 VITALS
HEIGHT: 70 IN | SYSTOLIC BLOOD PRESSURE: 127 MMHG | DIASTOLIC BLOOD PRESSURE: 78 MMHG | BODY MASS INDEX: 36.51 KG/M2 | HEART RATE: 48 BPM | WEIGHT: 255 LBS

## 2022-10-03 PROCEDURE — 93000 ELECTROCARDIOGRAM COMPLETE: CPT

## 2022-10-03 PROCEDURE — 99213 OFFICE O/P EST LOW 20 MIN: CPT

## 2022-10-03 RX ORDER — AZELASTINE HYDROCHLORIDE 137 UG/1
0.1 SPRAY, METERED NASAL TWICE DAILY
Qty: 1 | Refills: 1 | Status: DISCONTINUED | COMMUNITY
Start: 2020-09-17 | End: 2022-10-03

## 2022-10-03 RX ORDER — FLUTICASONE PROPIONATE AND SALMETEROL 250; 50 UG/1; UG/1
250-50 POWDER RESPIRATORY (INHALATION)
Qty: 3 | Refills: 1 | Status: DISCONTINUED | COMMUNITY
Start: 2017-02-21 | End: 2022-10-03

## 2022-10-03 RX ORDER — FLUTICASONE PROPION/SALMETEROL 500-50 MCG
BLISTER, WITH INHALATION DEVICE INHALATION
Refills: 0 | Status: ACTIVE | COMMUNITY

## 2022-10-03 RX ORDER — FLUTICASONE PROPIONATE AND SALMETEROL 250; 50 UG/1; UG/1
250-50 POWDER RESPIRATORY (INHALATION)
Qty: 3 | Refills: 0 | Status: DISCONTINUED | COMMUNITY
Start: 2022-09-23 | End: 2022-10-03

## 2022-10-03 RX ORDER — FLUTICASONE PROPIONATE AND SALMETEROL 250; 50 UG/1; UG/1
250-50 POWDER RESPIRATORY (INHALATION)
Qty: 1 | Refills: 1 | Status: DISCONTINUED | COMMUNITY
Start: 2021-07-09 | End: 2022-10-03

## 2022-10-03 RX ORDER — FLUOXETINE HCL 10 MG
TABLET ORAL
Refills: 0 | Status: ACTIVE | COMMUNITY

## 2022-10-03 NOTE — PHYSICAL EXAM
[General Appearance - Well Developed] : well developed [Normal Appearance] : normal appearance [Well Groomed] : well groomed [General Appearance - Well Nourished] : well nourished [No Deformities] : no deformities [General Appearance - In No Acute Distress] : no acute distress [Normal Conjunctiva] : the conjunctiva exhibited no abnormalities [Normal Oral Mucosa] : normal oral mucosa [Normal Jugular Venous V Waves Present] : normal jugular venous V waves present [] : no respiratory distress [Respiration, Rhythm And Depth] : normal respiratory rhythm and effort [Exaggerated Use Of Accessory Muscles For Inspiration] : no accessory muscle use [Auscultation Breath Sounds / Voice Sounds] : lungs were clear to auscultation bilaterally [Heart Rate And Rhythm] : heart rate and rhythm were normal [Heart Sounds] : normal S1 and S2 [Abnormal Walk] : normal gait [Skin Color & Pigmentation] : normal skin color and pigmentation [Oriented To Time, Place, And Person] : oriented to person, place, and time [Impaired Insight] : insight and judgment were intact [Affect] : the affect was normal [Mood] : the mood was normal [No Anxiety] : not feeling anxious

## 2022-10-04 NOTE — DISCUSSION/SUMMARY
[FreeTextEntry1] : Mr. Rene is a 46 year-old gentleman with palpitations and PAT on prior ambulatory telemetry.  EKG today shows NSR.  Palpitations have been well controlled with maintenance therapy of CCB.  Occasional breakthrough palpitations are controlled with Metoprolol.  His recent monitor did not demonstrate any sustained arrhythmia (non sustained SVT and PACs).  We discussed that this is a benign arrhythmia and no further intervention is necessary unless his symptoms worsen.  He will return for follow-up in six months or sooner as needed.

## 2022-10-04 NOTE — CARDIOLOGY SUMMARY
Spoke with Ms. Mario regarding message for received stating she requires EKG for surgery.  Asked her for her surgery date and she stated she did not have one.  Explained that she would need to see a provider for MCL for the surgery and she stated she was seen 12/7/21 and given clearance and was told by surgeon that she did not require a new MCL and only needed CXR and EKG and that she was coming in today to complete CXR.  Reviewed chart and EKG & CXR orders were entered by Dr. Mejía on 4/18/2022.  RN visit for EKG was scheduled for 4/22/2022. No further questions were asked.   [___] : [unfilled] [de-identified] : 7/25/2022 NSR at 92bpm

## 2022-10-04 NOTE — HISTORY OF PRESENT ILLNESS
[FreeTextEntry1] : Mr. Rene is a pleasant 46 year-old gentleman with a past medical history significant for Asthma, low testosterone and palpitations who presents for follow-up   \par \par He reports onset of palpitations ~2014 which have been brief in nature.  He had a sustained episode of palpitations in May 2016 with associated chest discomfort and SOB.  He was admitted to [facility] and was DANIELLA, TTE showed EF 55-60%, mild LA enlargement, minimal MR. ETT significant for 84% MPHR, no evidence for ischemia. He completed a 30 day event monitor (5/27/16 - 7/2/16) with Dr. Church significant for SR/ST, PACs, and PAT. Full disclosure of atrial runs not available. He only experiences palpitations if he doesn’t take his medications.  If he takes his diltiazem they do not occur and when he takes his PRN Metoprolol they subside.  No chest pain, SOB, syncope, near syncope or orthopnea.  ZioXT 8/2022 (3 days) SR with nonsustained SVT, infrequent PACs.  Notes that he developed a severe rash at the site of the event monitor; saw a dermatologist who prescribed steroids. \par  \par \par Sleep study 11/2018 negative for IVÁN\par \par \par

## 2023-05-19 ENCOUNTER — NON-APPOINTMENT (OUTPATIENT)
Age: 48
End: 2023-05-19

## 2023-05-22 ENCOUNTER — NON-APPOINTMENT (OUTPATIENT)
Age: 48
End: 2023-05-22

## 2023-05-23 ENCOUNTER — APPOINTMENT (OUTPATIENT)
Dept: PULMONOLOGY | Facility: CLINIC | Age: 48
End: 2023-05-23

## 2023-07-13 ENCOUNTER — APPOINTMENT (OUTPATIENT)
Dept: PULMONOLOGY | Facility: CLINIC | Age: 48
End: 2023-07-13
Payer: COMMERCIAL

## 2023-07-13 ENCOUNTER — LABORATORY RESULT (OUTPATIENT)
Age: 48
End: 2023-07-13

## 2023-07-13 ENCOUNTER — NON-APPOINTMENT (OUTPATIENT)
Age: 48
End: 2023-07-13

## 2023-07-13 VITALS
BODY MASS INDEX: 37.94 KG/M2 | WEIGHT: 265 LBS | SYSTOLIC BLOOD PRESSURE: 126 MMHG | DIASTOLIC BLOOD PRESSURE: 89 MMHG | TEMPERATURE: 98.4 F | OXYGEN SATURATION: 94 % | HEIGHT: 70 IN | HEART RATE: 84 BPM

## 2023-07-13 DIAGNOSIS — Z91.89 OTHER SPECIFIED PERSONAL RISK FACTORS, NOT ELSEWHERE CLASSIFIED: ICD-10-CM

## 2023-07-13 PROCEDURE — 94727 GAS DIL/WSHOT DETER LNG VOL: CPT

## 2023-07-13 PROCEDURE — 95012 NITRIC OXIDE EXP GAS DETER: CPT

## 2023-07-13 PROCEDURE — ZZZZZ: CPT

## 2023-07-13 PROCEDURE — 94010 BREATHING CAPACITY TEST: CPT

## 2023-07-13 PROCEDURE — 94618 PULMONARY STRESS TESTING: CPT

## 2023-07-13 PROCEDURE — 94729 DIFFUSING CAPACITY: CPT

## 2023-07-13 PROCEDURE — 99215 OFFICE O/P EST HI 40 MIN: CPT

## 2023-07-14 ENCOUNTER — NON-APPOINTMENT (OUTPATIENT)
Age: 48
End: 2023-07-14

## 2023-07-17 ENCOUNTER — NON-APPOINTMENT (OUTPATIENT)
Age: 48
End: 2023-07-17

## 2023-07-17 LAB
DEPRECATED ENGL PLANTAIN IGE RAST QL: 0
DEPRECATED FIREBUSH IGE RAST QL: 0
DEPRECATED GOOSEFOOT IGE RAST QL: 0
DEPRECATED MARSH ELDER IGE RAST QL: 0
DEPRECATED SALTWORT IGE RAST QL: 0
ENGL PLANTAIN IGE QN: <0.1 KUA/L
FIREBUSH IGE QN: <0.1 KUA/L
GOOSEFOOT IGE QN: <0.1 KUA/L
MARSH ELDER IGE QN: <0.1 KUA/L
SALTWORT IGE QN: <0.1 KUA/L
TOTAL IGE SMQN RAST: 180 KU/L

## 2023-07-18 NOTE — HISTORY OF PRESENT ILLNESS
[Former] : former [TextBox_4] : Pt is 45 yo M with PMH asthma, low testosterone, paroxymal atrial tachycardia on metoprolol. \par \par Referred by Dr. Evelyn Baird for evaluation of IVÁN as pt has noted headaches, daytime fatigue, and impotence. Treatment of IVÁN if present will likely lessen arrhythmia burden. \par \par Initial intake 8/16/18: Pt has never had sleep testing done in the past. Occasionally will wake up with headaches. Has difficulty with sleep initiation. Wakes up a few times each night. He does snore at night. Does not wake feeling refreshed. He avoid napping but does have daytime somnolence. He sleeps roughly 3-4 hours per night. \par He states 2-3 times total in the past few years he has "acid indigestion" and wakes up choking and not able to breathe. \par [place] sleepiness score today: 7 (average sleepiness) \par \par Asthma diagnosed in his late teens. Maintained on Advair 250-50 1 puff once daily; has been on this inhaler for "a couple years." Was on other inhalers prior to that. Also on Nasonex. Uses albuterol inhaler only rarely, more when he gets sick or when he works out. He may use nebs and Spiriva when he gets sick. He develops bronchitis about 3-4 times per year. \par He is allergic to eggs and felt poorly after getting a flu shot in the past, perhaps 10 or more years. \par ****\par \par 7-13-23:\par He took paxlovid in May when he tested positive for COVID\par He could barely walk. He had fevers, worsening asthma\par He was treated with a nebulizer\par He was given Spiriva, takes when he is sick\par He has not been requiring albuterol regularly\par He does not have an exercise routine as in he is not exercising regularly\par He has acid reflux, which wakes him up at night\par He wakes up also because he has to go to the bathroom\par He gets 10,000 steps per day monday-Friday\par \par Metoprolol, Ambien, Xanax, prozac, apriprozole, cialis, tiadylt, aspirin, gaviscon\par \par 9-17-20\par Patient was interviewed over the phone for a telehealth visit. \par Not feeling too well recently. breathing hasn't been upto par. started thursday. extremely tired, more than usual. sinuses bothering more than usual. having headache every now and then. he has took some claritin with some improvement.  currently, using advair twice a day. He had a COVID test and was negative. He has noted dust trigger/ changes in temperature/ AC trigger worsen nasal congestion. He denies any fever/chills/greenish nasal discharge.

## 2023-07-18 NOTE — DISCUSSION/SUMMARY
[FreeTextEntry1] : ATTENDING'S SUMMARY:\par 7-13-23:\par no pallor, no icterus\par no JVD, no hepatojugular reflux, no HSM\par no cervical adenopathy, no supraclavicular adenopathy\par normal s1/s2, no murmurs, rubs or gallops\par gynecomastia\par good air entry bilaterally, no wheezing, rhonchi or crackles. No coughing or wheezing on forceful expiratory maneuvers\par no cyanosis, no clubbing, no articular manifestations, no thickening of the skin\par no pedal edema

## 2023-07-18 NOTE — PROCEDURE
[FreeTextEntry1] : PFT 23\par FVC: 4.72 L (92%)\par FEV1: 3.50 L (88%)\par FEV1/FVC: 74%\par FEF 25-75%: 2.69 L/s (75%)\par T.38 L (90%)\par RV/T%\par DLCO: 24.37 (77%)\par FENO: 37 ppb, intermediate\par 6MWT 23: 554 meters, SpO2 94% -> 97%\par \par \par \par Spirometry and NIOX 20:\par FVC: 5.49 L (112%) --> 5.52 L (113%)\par FEV1: 3.84 L (96%) --> 4.00 L (100%)\par FEV1/FVC: 70% --> 73%\par [id number]%: 2.56 L/s (62%) --> 2.94 L/s (71%)\par FeNO: 22 ppb\par Mild obstructive defect. Normal FeNO.\par \par Home sleep test 18: slept 6 hours, AHI 4.7, min SpO2 91%\par \par Andrez 18:\par FVC: 5.63 L (112%)\par FEV1: 4.28 L (104%)\par FEV1/FVC: 76%\par [id number]%: 3.59 L/s (84%)\par No obstruction. FVC normal, restriction unlikely.

## 2023-07-18 NOTE — ASSESSMENT
[FreeTextEntry1] : 23:\par It was a pleasure to see Reji in follow up today. He was last seen in 2020. His respiratory issues are summarized:\par \par 1.  Asthma \par Reji appears to have mild intermittent asthma provoked by exercise and when his allergies are flaring. In the past he has had flares due to sinusitis \par Triggers include: dust, temp changes, respiratory illness and exercise\par He uses Advair 250-50 intermittently. He does not need albuterol regularly \par He had COVID in May, took Paxlovid. His asthma symptoms worsened and he was compliant with Advair 1 puff BID. He has since gone back to his intermittent use. \par \par PFT today, 23, demonstrate normal spirometry. There is no obstructive lung defect, FEV1/FVC: 74%. Total lung capacity is normal and there is no evidence of lung trapping, T.38 L (90%), RV/T%. Diffusion capacity is mildly reduced, DLCO: 24.37 (77%)\par FENO: 37 ppb, intermediate\par On 6MWT today, 23 he walked 554 meters without desaturation, SpO2 94% -> 97%\par \par Plan:\par - Given intermediate FENO, recommend better compliance with Advair 1 puff BID\par - Check eosinophil count, IgE level to identify phenotype\par - Continue albuterol PRN\par - We will consider Singulair in the future\par \par 2.   At risk for sleep apnea \par Reji has a collar size of 17", a Mallampati score of 4, and a BMI of approximately 33.  A home sleep study was performed 18.  Reji's AHI was 4.7 (normal) and his minimum SpO2 was 91% (i.e., normal study). \par His weight has increased by about 50 pounds since his last sleep study. We will repeat a home sleep study at this time.\par \par 3. Chronic sinusitis \par In the past, Reji has reported recurrent episodes of sinusitis. He may chronic sinusitis which causes worsening of asthma. Today he is not complaining of worsening sinus symptoms.\par \par 4.   Health maintenance \par He is allergic to eggs. He received Flublok (developed from non-egg sources) approximately 10/2019 and is up-to-date for the current flu season. \par Recommend exercise at least 5 days per week, 45-50 minutes per day\par Recommend weight loss, diet modifications\par \par Return to clinic: 6 months (PFT, NIOX, 6MWT prior)

## 2023-07-18 NOTE — REVIEW OF SYSTEMS
[Recent Wt Gain (___ Lbs)] : ~T recent [unfilled] lb weight gain [SOB on Exertion] : sob on exertion [Depression] : depression [Obesity] : obesity [Fever] : no fever [Chills] : no chills [Dry Eyes] : no dry eyes [Nasal Congestion] : no nasal congestion [Postnasal Drip] : no postnasal drip [Cough] : no cough [Sputum] : no sputum [Orthopnea] : no orthopnea [Syncope] : no syncope [Nasal Discharge] : no nasal discharge [GERD] : no gerd [Abdominal Pain] : no abdominal pain [Diarrhea] : no diarrhea [Arthralgias] : no arthralgias [Myalgias] : no myalgias [Anemia] : no anemia [Anxiety] : no anxiety

## 2023-07-19 ENCOUNTER — NON-APPOINTMENT (OUTPATIENT)
Age: 48
End: 2023-07-19

## 2023-07-23 ENCOUNTER — NON-APPOINTMENT (OUTPATIENT)
Age: 48
End: 2023-07-23

## 2023-07-27 ENCOUNTER — TRANSCRIPTION ENCOUNTER (OUTPATIENT)
Age: 48
End: 2023-07-27

## 2023-09-29 ENCOUNTER — NON-APPOINTMENT (OUTPATIENT)
Age: 48
End: 2023-09-29

## 2023-09-29 ENCOUNTER — APPOINTMENT (OUTPATIENT)
Dept: UROLOGY | Facility: CLINIC | Age: 48
End: 2023-09-29
Payer: COMMERCIAL

## 2023-09-29 VITALS
WEIGHT: 259 LBS | TEMPERATURE: 97.6 F | HEART RATE: 89 BPM | HEIGHT: 70 IN | SYSTOLIC BLOOD PRESSURE: 135 MMHG | BODY MASS INDEX: 37.08 KG/M2 | DIASTOLIC BLOOD PRESSURE: 84 MMHG

## 2023-09-29 DIAGNOSIS — E29.1 TESTICULAR HYPOFUNCTION: ICD-10-CM

## 2023-09-29 PROCEDURE — 99204 OFFICE O/P NEW MOD 45 MIN: CPT

## 2023-09-29 RX ORDER — TADALAFIL 20 MG/1
20 TABLET ORAL
Qty: 90 | Refills: 3 | Status: ACTIVE | COMMUNITY
Start: 2023-09-29 | End: 1900-01-01

## 2023-10-02 ENCOUNTER — RX RENEWAL (OUTPATIENT)
Age: 48
End: 2023-10-02

## 2023-10-08 ENCOUNTER — NON-APPOINTMENT (OUTPATIENT)
Age: 48
End: 2023-10-08

## 2023-11-10 ENCOUNTER — NON-APPOINTMENT (OUTPATIENT)
Age: 48
End: 2023-11-10

## 2023-11-10 ENCOUNTER — APPOINTMENT (OUTPATIENT)
Dept: HEART AND VASCULAR | Facility: CLINIC | Age: 48
End: 2023-11-10
Payer: COMMERCIAL

## 2023-11-10 VITALS
HEIGHT: 70 IN | OXYGEN SATURATION: 95 % | HEART RATE: 80 BPM | WEIGHT: 260 LBS | SYSTOLIC BLOOD PRESSURE: 126 MMHG | TEMPERATURE: 98.7 F | BODY MASS INDEX: 37.22 KG/M2 | DIASTOLIC BLOOD PRESSURE: 82 MMHG

## 2023-11-10 PROCEDURE — 99214 OFFICE O/P EST MOD 30 MIN: CPT | Mod: 25

## 2023-11-10 PROCEDURE — 93000 ELECTROCARDIOGRAM COMPLETE: CPT

## 2023-11-24 ENCOUNTER — APPOINTMENT (OUTPATIENT)
Dept: SLEEP CENTER | Facility: HOME HEALTH | Age: 48
End: 2023-11-24
Payer: COMMERCIAL

## 2023-11-24 ENCOUNTER — OUTPATIENT (OUTPATIENT)
Dept: OUTPATIENT SERVICES | Facility: HOSPITAL | Age: 48
LOS: 1 days | End: 2023-11-24
Payer: COMMERCIAL

## 2023-11-24 PROCEDURE — 95800 SLP STDY UNATTENDED: CPT

## 2023-11-24 PROCEDURE — XXXXX: CPT | Mod: 1L

## 2023-11-27 DIAGNOSIS — G47.33 OBSTRUCTIVE SLEEP APNEA (ADULT) (PEDIATRIC): ICD-10-CM

## 2023-12-01 ENCOUNTER — APPOINTMENT (OUTPATIENT)
Dept: HEART AND VASCULAR | Facility: CLINIC | Age: 48
End: 2023-12-01
Payer: COMMERCIAL

## 2023-12-01 ENCOUNTER — NON-APPOINTMENT (OUTPATIENT)
Age: 48
End: 2023-12-01

## 2023-12-01 VITALS
DIASTOLIC BLOOD PRESSURE: 87 MMHG | TEMPERATURE: 98.2 F | HEART RATE: 80 BPM | OXYGEN SATURATION: 97 % | SYSTOLIC BLOOD PRESSURE: 131 MMHG | BODY MASS INDEX: 37.22 KG/M2 | WEIGHT: 260 LBS | HEIGHT: 70 IN

## 2023-12-01 DIAGNOSIS — R03.0 ELEVATED BLOOD-PRESSURE READING, W/OUT DIAGNOSIS OF HYPERTENSION: ICD-10-CM

## 2023-12-01 PROCEDURE — 99204 OFFICE O/P NEW MOD 45 MIN: CPT | Mod: 25

## 2023-12-01 PROCEDURE — 93000 ELECTROCARDIOGRAM COMPLETE: CPT

## 2023-12-01 PROCEDURE — 99214 OFFICE O/P EST MOD 30 MIN: CPT | Mod: 25

## 2023-12-04 LAB
ALBUMIN SERPL ELPH-MCNC: 4.5 G/DL
ALP BLD-CCNC: 104 U/L
ALT SERPL-CCNC: 23 U/L
ANION GAP SERPL CALC-SCNC: 11 MMOL/L
AST SERPL-CCNC: 19 U/L
BILIRUB SERPL-MCNC: 0.9 MG/DL
BUN SERPL-MCNC: 13 MG/DL
CALCIUM SERPL-MCNC: 9.1 MG/DL
CHLORIDE SERPL-SCNC: 104 MMOL/L
CHOLEST SERPL-MCNC: 211 MG/DL
CO2 SERPL-SCNC: 25 MMOL/L
CREAT SERPL-MCNC: 0.88 MG/DL
EGFR: 106 ML/MIN/1.73M2
ESTIMATED AVERAGE GLUCOSE: 94 MG/DL
GLUCOSE SERPL-MCNC: 79 MG/DL
HBA1C MFR BLD HPLC: 4.9 %
HDLC SERPL-MCNC: 53 MG/DL
LDLC SERPL CALC-MCNC: 133 MG/DL
NONHDLC SERPL-MCNC: 157 MG/DL
POTASSIUM SERPL-SCNC: 4.8 MMOL/L
PROT SERPL-MCNC: 7.5 G/DL
SODIUM SERPL-SCNC: 140 MMOL/L
TRIGL SERPL-MCNC: 136 MG/DL

## 2023-12-04 RX ORDER — FLUTICASONE PROPIONATE AND SALMETEROL 250; 50 UG/1; UG/1
250-50 POWDER RESPIRATORY (INHALATION)
Qty: 1 | Refills: 2 | Status: ACTIVE | COMMUNITY
Start: 2023-07-13 | End: 1900-01-01

## 2023-12-13 ENCOUNTER — NON-APPOINTMENT (OUTPATIENT)
Age: 48
End: 2023-12-13

## 2023-12-15 ENCOUNTER — APPOINTMENT (OUTPATIENT)
Dept: UROLOGY | Facility: CLINIC | Age: 48
End: 2023-12-15
Payer: COMMERCIAL

## 2023-12-15 VITALS
WEIGHT: 260 LBS | HEIGHT: 70 IN | SYSTOLIC BLOOD PRESSURE: 132 MMHG | BODY MASS INDEX: 37.22 KG/M2 | TEMPERATURE: 96 F | HEART RATE: 93 BPM | DIASTOLIC BLOOD PRESSURE: 89 MMHG

## 2023-12-15 PROCEDURE — 99214 OFFICE O/P EST MOD 30 MIN: CPT

## 2023-12-15 NOTE — PHYSICAL EXAM
[Urethral Meatus] : meatus normal [Penis Abnormality] : normal circumcised penis [Scrotum] : the scrotum was normal [Testes Tenderness] : no tenderness of the testes [Testes Mass (___cm)] : there were no testicular masses [Normal Appearance] : normal appearance [Well Groomed] : well groomed [General Appearance - In No Acute Distress] : no acute distress [Edema] : no peripheral edema [Exaggerated Use Of Accessory Muscles For Inspiration] : no accessory muscle use [Abdomen Tenderness] : non-tender [Costovertebral Angle Tenderness] : no ~M costovertebral angle tenderness [Normal Station and Gait] : the gait and station were normal for the patient's age [] : no rash [No Focal Deficits] : no focal deficits [Oriented To Time, Place, And Person] : oriented to person, place, and time [Affect] : the affect was normal [Mood] : the mood was normal [de-identified] : B/L 12cc testis, no varicocele

## 2023-12-15 NOTE — HISTORY OF PRESENT ILLNESS
[FreeTextEntry1] : AIDAN BIGGS is a 48 year M PMH: Afib/Atach (tiadil, toprol), Asthma, hernia repair as child, IVÁN, IBS, S/P varicocelectomy 10 yrs ago and recurrence presenting on 12/15/2023  20 mg cialis poss L grade 1 varicocele  CC: Wants SA and results of hormones. Told low testosterone in past. Insurance issues with SA.  H/O recurring varicocele and hydrocele. Not  but wants children eventually. Not currently trying. Previous SA done, showed low volume per patient.  Cialis seems ok so far. Now 8/10 erections, previously 3-4/10. Prozac dose decreased recently.  Nocturia x3. Denies retention, dysuria. C/O urgency. Possibly related to IBS gas. Would like to try medication if indicated. No urinary medications in past. Recently started using Cpap.  Denies tobacco Father  of melanoma with mets   Labs: 23:  E2 34 LH 8.4 FSH 4.8

## 2023-12-15 NOTE — ASSESSMENT
[FreeTextEntry1] : 49yo M ED urine frequency IBX -continue cialis 20mg -for SA -trial alfuzosin 10mg -F/U 3 months

## 2023-12-15 NOTE — END OF VISIT
[FreeTextEntry3] : I, Dr. Rodriguez, personally performed the evaluation and management (E/M) services for this established patient who presents today with (a) new problem(s)/exacerbation of (an) existing condition(s). That E/M includes conducting the clinically appropriate interval history &/or exam, assessing all new/exacerbated conditions, and establishing a new plan of care. Today, my PREMA, LumiGrowjesu Douglasins, was here to observe my evaluation and management service for this new problem/exacerbated condition and follow the plan of care established by me going forward

## 2024-01-10 ENCOUNTER — APPOINTMENT (OUTPATIENT)
Dept: INTERNAL MEDICINE | Facility: CLINIC | Age: 49
End: 2024-01-10
Payer: COMMERCIAL

## 2024-01-10 PROCEDURE — 97802 MEDICAL NUTRITION INDIV IN: CPT | Mod: 95

## 2024-01-16 ENCOUNTER — NON-APPOINTMENT (OUTPATIENT)
Age: 49
End: 2024-01-16

## 2024-01-16 ENCOUNTER — APPOINTMENT (OUTPATIENT)
Dept: PULMONOLOGY | Facility: CLINIC | Age: 49
End: 2024-01-16
Payer: COMMERCIAL

## 2024-01-16 ENCOUNTER — APPOINTMENT (OUTPATIENT)
Dept: PULMONOLOGY | Facility: CLINIC | Age: 49
End: 2024-01-16

## 2024-01-16 PROCEDURE — 94729 DIFFUSING CAPACITY: CPT

## 2024-01-16 PROCEDURE — ZZZZZ: CPT

## 2024-01-16 PROCEDURE — 94010 BREATHING CAPACITY TEST: CPT

## 2024-01-16 PROCEDURE — 94618 PULMONARY STRESS TESTING: CPT

## 2024-01-16 PROCEDURE — 94727 GAS DIL/WSHOT DETER LNG VOL: CPT

## 2024-01-30 ENCOUNTER — APPOINTMENT (OUTPATIENT)
Dept: PULMONOLOGY | Facility: CLINIC | Age: 49
End: 2024-01-30
Payer: COMMERCIAL

## 2024-01-30 DIAGNOSIS — J45.909 UNSPECIFIED ASTHMA, UNCOMPLICATED: ICD-10-CM

## 2024-01-30 DIAGNOSIS — G47.33 OBSTRUCTIVE SLEEP APNEA (ADULT) (PEDIATRIC): ICD-10-CM

## 2024-01-30 PROCEDURE — 99443: CPT

## 2024-01-30 RX ORDER — BUDESONIDE AND FORMOTEROL FUMARATE DIHYDRATE 160; 4.5 UG/1; UG/1
160-4.5 AEROSOL RESPIRATORY (INHALATION) TWICE DAILY
Qty: 1 | Refills: 2 | Status: ACTIVE | COMMUNITY
Start: 2024-01-30 | End: 1900-01-01

## 2024-01-30 NOTE — HISTORY OF PRESENT ILLNESS
[TextBox_4] : Pt is 47 yo M with PMH asthma, low testosterone, paroxymal atrial tachycardia on metoprolol.   Referred by Dr. Evelyn Baird for evaluation of IVÁN as pt has noted headaches, daytime fatigue, and impotence. Treatment of IVÁN if present will likely lessen arrhythmia burden.   Initial intake 8/16/18: Pt has never had sleep testing done in the past. Occasionally will wake up with headaches. Has difficulty with sleep initiation. Wakes up a few times each night. He does snore at night. Does not wake feeling refreshed. He avoid napping but does have daytime somnolence. He sleeps roughly 3-4 hours per night.  He states 2-3 times total in the past few years he has "acid indigestion" and wakes up choking and not able to breathe.  Bayard sleepiness score today: 7 (average sleepiness)   Asthma diagnosed in his late teens. Maintained on Advair 250-50 1 puff once daily; has been on this inhaler for "a couple years." Was on other inhalers prior to that. Also on Nasonex. Uses albuterol inhaler only rarely, more when he gets sick or when he works out. He may use nebs and Spiriva when he gets sick. He develops bronchitis about 3-4 times per year.  He is allergic to eggs and felt poorly after getting a flu shot in the past, perhaps 10 or more years.  ****  1-30-24: He is having trouble adjusting to Auto-pap. He feels the ramp pressure is not enough when he puts the CPAP on, but then feels the pressure is too high.  He is having shortness of breath upon exertion More compliant with Advair 250-50  7-13-23: He took paxlovid in May when he tested positive for COVID He could barely walk. He had fevers, worsening asthma He was treated with a nebulizer He was given Spiriva, takes when he is sick He has not been requiring albuterol regularly He does not have an exercise routine as in he is not exercising regularly He has acid reflux, which wakes him up at night He wakes up also because he has to go to the bathroom He gets 10,000 steps per day monday-Friday  Metoprolol, Ambien, Xanax, prozac, apriprozole, cialis, tiadylt, aspirin, gaviscon  9-17-20 Patient was interviewed over the phone for a telehealth visit.  Not feeling too well recently. breathing hasn't been upto par. started thursday. extremely tired, more than usual. sinuses bothering more than usual. having headache every now and then. he has took some claritin with some improvement.  currently, using advair twice a day. He had a COVID test and was negative. He has noted dust trigger/ changes in temperature/ AC trigger worsen nasal congestion. He denies any fever/chills/greenish nasal discharge.

## 2024-01-30 NOTE — REASON FOR VISIT
[Home] : at home, [unfilled] , at the time of the visit. [Medical Office: ([facility])___] : at the medical office located in  [Verbal consent obtained from patient] : the patient, [unfilled]

## 2024-01-30 NOTE — ADDENDUM
[FreeTextEntry1] : I, Dr. Partha Glover, personally performed the evaluation and management (E/M) services for this established patient who presents today with (a) new problem(s)/exacerbation of (an) existing condition(s). That E/M includes conducting the clinically appropriate interval history &/or exam, assessing all new/exacerbated conditions, and establishing a new plan of care. Today, my PREMA, Ms. Lilli Galarza was here to observe my evaluation and management service for this new problem/exacerbated condition and follow the plan of care established by me going forward.

## 2024-01-30 NOTE — PROCEDURE
[FreeTextEntry1] : PFT 24 FVC: 5.03 L (99%) FEV1: 3.80 L (96%) FEV1/FVC: 76% FEF 25-75%: 2.65 L/s (75%) T.74 L (95%) RV/T% DLCO: 29.11 (93%) FENO: 80 ppb  6MWT 24: 500 meters, SpO2 97% -> 98%  Compliance report: usage >4 hours 23% Usage <4 hours 60% Leak- 8L average Average use- 2 hours 54 minutes  PFT 23 FVC: 4.72 L (92%) FEV1: 3.50 L (88%) FEV1/FVC: 74% FEF 25-75%: 2.69 L/s (75%) T.38 L (90%) RV/T% DLCO: 24.37 (77%) FENO: 37 ppb, intermediate 6MWT 23: 554 meters, SpO2 94% -> 97%    Spirometry and NIOX 20: FVC: 5.49 L (112%) --> 5.52 L (113%) FEV1: 3.84 L (96%) --> 4.00 L (100%) FEV1/FVC: 70% --> 73% QAG71-08%: 2.56 L/s (62%) --> 2.94 L/s (71%) FeNO: 22 ppb Mild obstructive defect. Normal FeNO.  Home sleep test 18: slept 6 hours, AHI 4.7, min SpO2 91%  [place] 18: FVC: 5.63 L (112%) FEV1: 4.28 L (104%) FEV1/FVC: 76% OAD25-93%: 3.59 L/s (84%) No obstruction. FVC normal, restriction unlikely.

## 2024-01-30 NOTE — ASSESSMENT
[FreeTextEntry1] : 1-30-24:  It was a pleasure to speak to Reji in follow up today. He was last seen in September 2020. His respiratory issues are summarized:  1. Asthma Reji appears to have mild intermittent asthma provoked by exercise and when his allergies are flaring. In the past he has had flares due to sinusitis Triggers include: dust, temp changes, respiratory illness and exercise He had COVID in May, took Paxlovid.   PFT done 1/16/24. Spirometry, TLC and diffusion capacity are normal.  Since July, FENO has increased from 37 ppb to 80 ppb. He walked 500 meters on 6MWT without desaturation, SpO2 97% -> 98% He notices even with better compliance of Advair that he is experiencing shortness of breath during exercise.  Plan: - Given elevated NIOX, we will switch him to Symbicort, 2 puffs twice a day. He can increase Symbicort to 2 puffs 3 times a day, if needed. - Repeat Spirometry and Niox in a few weeks - Continue albuterol PRN - We will consider Singulair in the future  2. At risk for sleep apnea Reji has a collar size of 17", a Mallampati score of 4, and a BMI of approximately 33. A home sleep study was performed 11/5/18. Reji's AHI was 4.7 (normal) and his minimum SpO2 was 91% (i.e., normal study). His weight has increased by about 50 pounds since his last sleep study. Repeat home sleep study Nov 2023 shows moderate obstructive sleep apnea, AHI: 16.6. We started him on Auto-pap, but he his having difficulty adjusting to the pressures. His compliance report shows a median pressure of 7. We will try to switch him from autopap to cpap at 7cm H20. If we are unable to do this, we will order a CPAP titration study and consider referral to one of our sleep specialists.  3. Chronic sinusitis In the past, Reji has reported recurrent episodes of sinusitis. He may chronic sinusitis which causes worsening of asthma. Today he is not complaining of worsening sinus symptoms.  4. Health maintenance He is allergic to eggs. He received Flublok (developed from non-egg sources) approximately 10/2019 and is up-to-date for the current flu season. Recommend exercise at least 5 days per week, 45-50 minutes per day Recommend weight loss, diet modifications  Return to clinic: 6 weeks (telehealth)

## 2024-01-30 NOTE — REVIEW OF SYSTEMS
[Fever] : no fever [Chills] : no chills [Dry Eyes] : no dry eyes [Nasal Congestion] : no nasal congestion [Postnasal Drip] : no postnasal drip [Cough] : no cough [Sputum] : no sputum [Orthopnea] : no orthopnea [Syncope] : no syncope [Nasal Discharge] : no nasal discharge [GERD] : no gerd [Abdominal Pain] : no abdominal pain [Diarrhea] : no diarrhea [Arthralgias] : no arthralgias [Myalgias] : no myalgias [Anemia] : no anemia [Anxiety] : no anxiety

## 2024-02-12 ENCOUNTER — RX RENEWAL (OUTPATIENT)
Age: 49
End: 2024-02-12

## 2024-02-12 RX ORDER — METOPROLOL TARTRATE 25 MG/1
25 TABLET, FILM COATED ORAL
Qty: 90 | Refills: 1 | Status: ACTIVE | COMMUNITY
Start: 2019-11-27 | End: 1900-01-01

## 2024-03-01 ENCOUNTER — APPOINTMENT (OUTPATIENT)
Dept: UROLOGY | Facility: CLINIC | Age: 49
End: 2024-03-01
Payer: COMMERCIAL

## 2024-03-01 ENCOUNTER — APPOINTMENT (OUTPATIENT)
Dept: HEART AND VASCULAR | Facility: CLINIC | Age: 49
End: 2024-03-01
Payer: COMMERCIAL

## 2024-03-01 VITALS
HEIGHT: 70 IN | BODY MASS INDEX: 38.4 KG/M2 | OXYGEN SATURATION: 97 % | TEMPERATURE: 97.4 F | WEIGHT: 268.25 LBS | HEART RATE: 92 BPM | SYSTOLIC BLOOD PRESSURE: 145 MMHG | DIASTOLIC BLOOD PRESSURE: 85 MMHG

## 2024-03-01 VITALS
DIASTOLIC BLOOD PRESSURE: 95 MMHG | TEMPERATURE: 97.3 F | SYSTOLIC BLOOD PRESSURE: 145 MMHG | WEIGHT: 265 LBS | HEIGHT: 70 IN | BODY MASS INDEX: 37.94 KG/M2 | HEART RATE: 115 BPM

## 2024-03-01 DIAGNOSIS — I47.10 SUPRAVENTRICULAR TACHYCARDIA, UNSPECIFIED: ICD-10-CM

## 2024-03-01 DIAGNOSIS — I10 ESSENTIAL (PRIMARY) HYPERTENSION: ICD-10-CM

## 2024-03-01 DIAGNOSIS — E66.9 OBESITY, UNSPECIFIED: ICD-10-CM

## 2024-03-01 DIAGNOSIS — R06.02 SHORTNESS OF BREATH: ICD-10-CM

## 2024-03-01 DIAGNOSIS — N40.0 BENIGN PROSTATIC HYPERPLASIA WITHOUT LOWER URINARY TRACT SYMPMS: ICD-10-CM

## 2024-03-01 DIAGNOSIS — E78.5 HYPERLIPIDEMIA, UNSPECIFIED: ICD-10-CM

## 2024-03-01 DIAGNOSIS — N52.9 MALE ERECTILE DYSFUNCTION, UNSPECIFIED: ICD-10-CM

## 2024-03-01 PROCEDURE — 99214 OFFICE O/P EST MOD 30 MIN: CPT

## 2024-03-01 PROCEDURE — 99215 OFFICE O/P EST HI 40 MIN: CPT

## 2024-03-01 PROCEDURE — G2211 COMPLEX E/M VISIT ADD ON: CPT

## 2024-03-01 PROCEDURE — 93975 VASCULAR STUDY: CPT

## 2024-03-01 RX ORDER — ALFUZOSIN HYDROCHLORIDE 10 MG/1
10 TABLET, EXTENDED RELEASE ORAL DAILY
Qty: 30 | Refills: 3 | Status: ACTIVE | COMMUNITY
Start: 2023-12-15 | End: 1900-01-01

## 2024-03-01 RX ORDER — TADALAFIL 5 MG/1
5 TABLET ORAL
Qty: 90 | Refills: 3 | Status: ACTIVE | COMMUNITY
Start: 2024-03-01 | End: 1900-01-01

## 2024-03-01 RX ORDER — ROSUVASTATIN CALCIUM 5 MG/1
5 TABLET, FILM COATED ORAL
Qty: 90 | Refills: 1 | Status: ACTIVE | COMMUNITY
Start: 2023-12-04 | End: 1900-01-01

## 2024-03-01 RX ORDER — DILTIAZEM HYDROCHLORIDE 240 MG/1
240 CAPSULE, EXTENDED RELEASE ORAL DAILY
Qty: 90 | Refills: 1 | Status: ACTIVE | COMMUNITY
Start: 2021-08-02 | End: 1900-01-01

## 2024-03-01 NOTE — HISTORY OF PRESENT ILLNESS
[FreeTextEntry1] : AIDAN BIGGS is a 48 year M presenting on 2024 PMH: Afib/Atach (tiadil, toprol), Asthma, hernia repair as child, IVÁN, IBS, hypogonadism, S/P varicocelectomy 10 yrs ago and recurrence, ED  20 mg cialis alfuzosin 10mg  Not  but wants children eventually. Not currently trying. Previous SA done, showed low volume per patient. Wants to repeat. Trying to find place that is less expensive.  Taking alfuzosin inconsistently. Doesn't feel that he tried long enough to assess efficacy. Was adjusting other medications and didn't want to make too many changes at once. Primary issue is nocturia x3-4.  No longer taking prozac. Some improvement with ED. Using cialis 20mg. 5-6/10 erections. Detumesces quickly. Tried sildenafil in past and caused issue with heart. Will consider ICI.   Denies tobacco Father  of melanoma with mets  Labs:  LH 8.4 FSH 4.8 E2 34

## 2024-03-01 NOTE — REVIEW OF SYSTEMS
[Dyspnea on exertion] : dyspnea during exertion [Diarrhea] : diarrhea [Depression] : depression [Negative] : Psychiatric [FreeTextEntry7] : trigger foods

## 2024-03-01 NOTE — ASSESSMENT
[FreeTextEntry1] : 49yo M ED, BPH -script for SA -continue alfuzosin 10, take daily -trial 5mg cialis daily with additional 1 or 2 pills PRN. Will consider ICI. -PSA -UA, Ucx -for scrotal sono to R/O varicocele - bilateral varicocelee noted -F/U 3 mo

## 2024-03-01 NOTE — PHYSICAL EXAM
[Normal Appearance] : normal appearance [Well Groomed] : well groomed [General Appearance - In No Acute Distress] : no acute distress [Exaggerated Use Of Accessory Muscles For Inspiration] : no accessory muscle use [Respiration, Rhythm And Depth] : normal respiratory rhythm and effort [Normal Station and Gait] : the gait and station were normal for the patient's age [] : no rash [No Focal Deficits] : no focal deficits [Oriented To Time, Place, And Person] : oriented to person, place, and time [Affect] : the affect was normal [Mood] : the mood was normal

## 2024-03-01 NOTE — HISTORY OF PRESENT ILLNESS
[FreeTextEntry1] : 47M w parox SVT, managed with diltiazem and PRN metoprolol (4x a month on average), mild asthma, on prozac, IBS who is in to establish with a new general cardiologist / prevention visit.  SVT: at least 10 yrs diagnosis, sx: palpitations, lightheaded, dizzy, presyncope but it's been a while since he has had these symptoms. Follows Dr Baird.  Asthma: triggered by exercise, tough to get uphill when asthma acts up No chest pain, rare palps now with meds, no edema, orthopnea  3/1/24 FU: still having some dyspnea with exertion - has not been able to increase exercise capacity, thinks will be beneficial to loose weight - has attempted loosing wt many times in the past - reports some improvement w pulm medication  BP: preHTN, has a home bp cuff not being used  Lipids: no recent A1c: no recent Weight: 260 lb, BMI 37; previous wt 220-230 lbs Smoking: 10 yrs of smoking, quit at 30 yo Substance: occasional etoh Diet: none followed, cycles between healthy and unhealthy eating Exercise: knee OA limitation, rotator cuff surgery; prefers elliptical belongs to a gym but not going; has not had any recently Stress: father recently passed away, no kids, not  Apnea: being worked up now   FH: F- HTN, Skin Ca w mets to brain,  M - alive, asthma, dementia, thyroid PGF - MI at 40s

## 2024-03-01 NOTE — PHYSICAL EXAM
[Well Developed] : well developed [Well Nourished] : well nourished [No Acute Distress] : no acute distress [Obese] : obese [Normal Conjunctiva] : normal conjunctiva [Normal Venous Pressure] : normal venous pressure [No Carotid Bruit] : no carotid bruit [Normal S1, S2] : normal S1, S2 [No Rub] : no rub [No Murmur] : no murmur [No Gallop] : no gallop [Clear Lung Fields] : clear lung fields [Good Air Entry] : good air entry [No Respiratory Distress] : no respiratory distress  [Soft] : abdomen soft [No Masses/organomegaly] : no masses/organomegaly [Non Tender] : non-tender [Normal Bowel Sounds] : normal bowel sounds [Normal Gait] : normal gait [No Edema] : no edema [No Cyanosis] : no cyanosis [No Clubbing] : no clubbing [No Varicosities] : no varicosities [No Rash] : no rash [No Skin Lesions] : no skin lesions [Moves all extremities] : moves all extremities [Normal Speech] : normal speech [No Focal Deficits] : no focal deficits [Normal memory] : normal memory [Alert and Oriented] : alert and oriented

## 2024-03-01 NOTE — REASON FOR VISIT
Please sign increased Synthroid dose, as well as patient is requesting Diflucan since she is taking antibiotics. Pended. If approved, please sign    [Symptom and Test Evaluation] : symptom and test evaluation [FreeTextEntry1] : CV Data: ECG 11/10/23: NSR, normal axis/intervals ECG 7/25/2022: NSR at 92bpm OSH ([facility]): TTE 5/2016: LVEF 55-60% mild LAE MCOT 2016: PACs, PAT, not available for review

## 2024-03-04 LAB
APPEARANCE: CLEAR
BACTERIA UR CULT: NORMAL
BILIRUBIN URINE: NEGATIVE
BLOOD URINE: NEGATIVE
COLOR: YELLOW
GLUCOSE QUALITATIVE U: NEGATIVE MG/DL
KETONES URINE: NEGATIVE MG/DL
LEUKOCYTE ESTERASE URINE: NEGATIVE
NITRITE URINE: NEGATIVE
PH URINE: 6
PROTEIN URINE: NEGATIVE MG/DL
SPECIFIC GRAVITY URINE: 1.03
UROBILINOGEN URINE: 0.2 MG/DL

## 2024-04-17 ENCOUNTER — APPOINTMENT (OUTPATIENT)
Dept: ORTHOPEDIC SURGERY | Facility: CLINIC | Age: 49
End: 2024-04-17
Payer: COMMERCIAL

## 2024-04-17 VITALS
WEIGHT: 268 LBS | HEART RATE: 85 BPM | DIASTOLIC BLOOD PRESSURE: 80 MMHG | BODY MASS INDEX: 38.37 KG/M2 | OXYGEN SATURATION: 98 % | HEIGHT: 70 IN | SYSTOLIC BLOOD PRESSURE: 140 MMHG

## 2024-04-17 DIAGNOSIS — M17.0 BILATERAL PRIMARY OSTEOARTHRITIS OF KNEE: ICD-10-CM

## 2024-04-17 PROCEDURE — 73564 X-RAY EXAM KNEE 4 OR MORE: CPT | Mod: 50

## 2024-04-17 PROCEDURE — 20610 DRAIN/INJ JOINT/BURSA W/O US: CPT | Mod: 50

## 2024-04-17 PROCEDURE — 99203 OFFICE O/P NEW LOW 30 MIN: CPT | Mod: 25

## 2024-04-17 RX ORDER — HYALURONATE SODIUM, STABILIZED 88 MG/4 ML
88 SYRINGE (ML) INTRAARTICULAR
Qty: 2 | Refills: 0 | Status: ACTIVE | COMMUNITY
Start: 2024-04-17 | End: 1900-01-01

## 2024-04-18 ENCOUNTER — APPOINTMENT (OUTPATIENT)
Dept: ORTHOPEDIC SURGERY | Facility: CLINIC | Age: 49
End: 2024-04-18

## 2024-04-18 NOTE — PROCEDURE
[de-identified] : The right knee was prepped with alchohol and ethyl chloride was used to numb the skin. A 3 cc injection with 1 cc xylocaine, 1cc sensoricaine and 1 cc kenalog , was given without complication into the knee joint. Patient instructed that there may be an inflammatory flare for 24 hrs , to use ice or advil if needed  The left knee was prepped with alchohol and ethyl chloride was used to numb the skin. A 3 cc injection with 1 cc xylocaine, 1cc sensoricaine and 1 cc kenalog, was given without complication into the knee joint. Patient instructed that there may be an inflammatory flare for 24 hrs , to use ice or advil if needed

## 2024-04-18 NOTE — DISCUSSION/SUMMARY
[de-identified] : Impression: 49 y/o M with bilateral knee OA with recurrent symptoms after conservative management  and seeking other treatment options.  Plan: Pt has been offered and received steroid injection in both knees to ameliorate active inflammatory symptoms in knees.  He has been counseled on the role of weight loss and Physical Therapy in the treatment of OA of weightbearing joints. We had a long discussion today about the risks and benefits of various OrthoBiologics injection procedures. These included PRP, Amniotic Allograft product, Lipogems/lipoaspirate injections. The fact that these treatments are investigational and not approved by any insurance product was also discussed. Pt has elected to proceed with repeat HA treatment while he considers PRP injections as it has worked for a significant temporary basis in the past. Note Mr. Rene if known to have an egg allergy and therefore Monovisc HA should be used as he has not had an allergic reaction to it with past. Ultimately Mr. Rene has been advised that he may need to consider TKA surgery in the event that conservative measures fail. He will F/U in 6 weeks for re-evaluation. stated

## 2024-04-18 NOTE — PHYSICAL EXAM
[de-identified] :   KNEE EXAM:   INSPECTION: SKIN-NONE SCARS-NONE TRAUMA-NONE ERYTHEMA-NONE SWELLING-minimal suprapatellar MUSCLE ATROPHY-NONE EFFUSION-NONE ASYMMETRY-NONE GAIT-+ ANTALGIA MUSCLE WEAKNESS-Quads/Hamstring STANDING LIMB ALIGNMENT-(NEUTRAL/ VALGUS/VARUS)    PALPATION-  (-)PATELLA BALLOTING  (-)DEFORMITY   ROM: ACTIVE AND PASSIVE EXTENSION-FLEXION 0-125 degrees   NEUROVASCULAR GROSS MOTOR/SENSORY FXN-INTACT VASCULAR: PULSES 2+ POPLITEAL/DORSALIS PEDIS/POSTERIOR TIBIAL   SPECIAL TESTS  (-)LACHMAN'S TEST  (-)ANTERIOR DRAWER TEST  (-)VALGUS STRESS TEST  (-)VARUS STRESS TEST  (-)LA'S TEST  (-)GAUTAM  (-)PATELLA APPREHENSION  (+)PATELLAR GRIND TEST   [de-identified] : bilateral knee x-rays taken today in 4 views was reviewed by me and demonstrates KL3 OA

## 2024-04-18 NOTE — HISTORY OF PRESENT ILLNESS
[7] : a current pain level of 7/10 [de-identified] : AIDAN BIGGS is a 48 year  old  M patient that presents today with bilateral knee pain secondary to a fall downstair resulting in and axial loading injury to both knees. Prior evaluation with x-ray and MRI shows evidence of DJD and meniscal tear. He has been successfully treated in the past with Monovisc x 4 rounds.  Mr. Biggs is able to ambulate 10 blocks without stopping due to pain.  He would like to consider other conservative treatment options. Pain is anterior and provoked by stair and incline/decline walking.

## 2024-05-08 ENCOUNTER — APPOINTMENT (OUTPATIENT)
Dept: HEART AND VASCULAR | Facility: CLINIC | Age: 49
End: 2024-05-08

## 2024-05-10 ENCOUNTER — APPOINTMENT (OUTPATIENT)
Dept: HEART AND VASCULAR | Facility: CLINIC | Age: 49
End: 2024-05-10

## 2024-05-10 VITALS
SYSTOLIC BLOOD PRESSURE: 131 MMHG | HEIGHT: 70 IN | OXYGEN SATURATION: 95 % | WEIGHT: 268 LBS | HEART RATE: 89 BPM | DIASTOLIC BLOOD PRESSURE: 83 MMHG | BODY MASS INDEX: 38.37 KG/M2

## 2024-05-10 PROCEDURE — 99213 OFFICE O/P EST LOW 20 MIN: CPT

## 2024-05-20 ENCOUNTER — NON-APPOINTMENT (OUTPATIENT)
Age: 49
End: 2024-05-20

## 2024-05-22 ENCOUNTER — APPOINTMENT (OUTPATIENT)
Dept: ORTHOPEDIC SURGERY | Facility: CLINIC | Age: 49
End: 2024-05-22

## 2024-06-10 RX ORDER — ALBUTEROL SULFATE 90 UG/1
108 (90 BASE) AEROSOL, METERED RESPIRATORY (INHALATION)
Qty: 1 | Refills: 1 | Status: ACTIVE | COMMUNITY
Start: 2020-03-26 | End: 1900-01-01

## 2024-06-10 RX ORDER — ALBUTEROL SULFATE 2.5 MG/3ML
(2.5 MG/3ML) SOLUTION RESPIRATORY (INHALATION)
Qty: 150 | Refills: 1 | Status: ACTIVE | COMMUNITY
Start: 2018-08-21 | End: 1900-01-01

## 2024-06-21 ENCOUNTER — APPOINTMENT (OUTPATIENT)
Dept: UROLOGY | Facility: CLINIC | Age: 49
End: 2024-06-21

## 2024-07-22 ENCOUNTER — RX RENEWAL (OUTPATIENT)
Age: 49
End: 2024-07-22

## 2024-07-22 RX ORDER — DILTIAZEM HYDROCHLORIDE 240 MG/1
240 CAPSULE, EXTENDED RELEASE ORAL
Qty: 30 | Refills: 5 | Status: ACTIVE | COMMUNITY
Start: 2024-07-22 | End: 1900-01-01

## 2024-08-07 ENCOUNTER — RX RENEWAL (OUTPATIENT)
Age: 49
End: 2024-08-07

## 2024-08-12 ENCOUNTER — RX RENEWAL (OUTPATIENT)
Age: 49
End: 2024-08-12

## 2024-08-14 ENCOUNTER — APPOINTMENT (OUTPATIENT)
Dept: HEART AND VASCULAR | Facility: CLINIC | Age: 49
End: 2024-08-14
Payer: COMMERCIAL

## 2024-08-14 ENCOUNTER — NON-APPOINTMENT (OUTPATIENT)
Age: 49
End: 2024-08-14

## 2024-08-14 VITALS
SYSTOLIC BLOOD PRESSURE: 119 MMHG | BODY MASS INDEX: 37.94 KG/M2 | HEIGHT: 70 IN | OXYGEN SATURATION: 96 % | TEMPERATURE: 98.2 F | HEART RATE: 67 BPM | WEIGHT: 265 LBS | DIASTOLIC BLOOD PRESSURE: 82 MMHG

## 2024-08-14 DIAGNOSIS — E78.5 HYPERLIPIDEMIA, UNSPECIFIED: ICD-10-CM

## 2024-08-14 DIAGNOSIS — I47.10 SUPRAVENTRICULAR TACHYCARDIA, UNSPECIFIED: ICD-10-CM

## 2024-08-14 DIAGNOSIS — E66.9 OBESITY, UNSPECIFIED: ICD-10-CM

## 2024-08-14 DIAGNOSIS — R06.02 SHORTNESS OF BREATH: ICD-10-CM

## 2024-08-14 DIAGNOSIS — I10 ESSENTIAL (PRIMARY) HYPERTENSION: ICD-10-CM

## 2024-08-14 PROCEDURE — 93000 ELECTROCARDIOGRAM COMPLETE: CPT

## 2024-08-14 PROCEDURE — 93306 TTE W/DOPPLER COMPLETE: CPT

## 2024-08-14 PROCEDURE — G2211 COMPLEX E/M VISIT ADD ON: CPT

## 2024-08-14 PROCEDURE — 99214 OFFICE O/P EST MOD 30 MIN: CPT

## 2024-08-14 NOTE — HISTORY OF PRESENT ILLNESS
[FreeTextEntry1] : 47M w parox SVT, managed with diltiazem and PRN metoprolol (4x a month on average), mild asthma, on prozac, IBS who is in to establish with a new general cardiologist / prevention visit.  SVT: at least 10 yrs diagnosis, sx: palpitations, lightheaded, dizzy, presyncope but it's been a while since he has had these symptoms. Follows Dr Baird.  Asthma: triggered by exercise, tough to get uphill when asthma acts up No chest pain, rare palps now with meds, no edema, orthopnea  3/1/24 FU: still having some dyspnea with exertion - has not been able to increase exercise capacity, thinks will be beneficial to loose weight - has attempted loosing wt many times in the past - reports some improvement w pulm medication  24 FU: orthostatic sx with buspar, got better after stopping - dyspnea better with pulm inhalers. normal echo today - orthostatic VS: lying 111/73, sitting 102/67, standing 109/70 - will be on GLP1 agonist  BP: preHTN, has a home bp cuff not being used  Lipids: no recent A1c: no recent Weight: 260 lb, BMI 37; previous wt 220-230 lbs Smoking: 10 yrs of smoking, quit at 28 yo Substance: occasional etoh Diet: none followed, cycles between healthy and unhealthy eating Exercise: knee OA limitation, rotator cuff surgery; prefers elliptical belongs to a gym but not going; has not had any recently Stress: father recently passed away, no kids, not  Apnea: being worked up now   FH: F- HTN, Skin Ca w mets to brain,  M - alive, asthma, dementia, thyroid PGF - MI at 40s 
No

## 2024-08-14 NOTE — REASON FOR VISIT
[FreeTextEntry1] : CV Data: ECG 8/14/24: sinus, atrial bigeminy ECG 11/10/23: NSR, normal axis/intervals ECG 7/25/2022: NSR at 92bpm OSH ([facility]): TTE 5/2016: LVEF 55-60% mild LAE TTE 8/14/24: normal. LVEF 61% MCOT 2016: PACs, PAT, not available for review

## 2024-08-15 LAB
CHOLEST SERPL-MCNC: 197 MG/DL
HDLC SERPL-MCNC: 59 MG/DL
LDLC SERPL CALC-MCNC: 122 MG/DL
NONHDLC SERPL-MCNC: 138 MG/DL
TRIGL SERPL-MCNC: 86 MG/DL

## 2024-08-26 ENCOUNTER — RX RENEWAL (OUTPATIENT)
Age: 49
End: 2024-08-26

## 2024-08-26 RX ORDER — ROSUVASTATIN CALCIUM 5 MG/1
5 TABLET, FILM COATED ORAL
Qty: 90 | Refills: 2 | Status: ACTIVE | COMMUNITY
Start: 2024-08-26 | End: 1900-01-01

## 2024-08-29 ENCOUNTER — APPOINTMENT (OUTPATIENT)
Dept: ENDOCRINOLOGY | Facility: CLINIC | Age: 49
End: 2024-08-29
Payer: COMMERCIAL

## 2024-08-29 VITALS
BODY MASS INDEX: 38.45 KG/M2 | SYSTOLIC BLOOD PRESSURE: 141 MMHG | HEART RATE: 50 BPM | WEIGHT: 268 LBS | DIASTOLIC BLOOD PRESSURE: 76 MMHG

## 2024-08-29 DIAGNOSIS — E66.9 OBESITY, UNSPECIFIED: ICD-10-CM

## 2024-08-29 PROCEDURE — 99205 OFFICE O/P NEW HI 60 MIN: CPT | Mod: 25

## 2024-08-29 PROCEDURE — 36415 COLL VENOUS BLD VENIPUNCTURE: CPT

## 2024-08-29 NOTE — REASON FOR VISIT
----- Message from Diya Blancas MD sent at 9/3/2022 11:59 AM CDT -----  Call pt with results.  _____________  There were some cells in the urine including some blood.  It could have been from a perioId but should be rechecked.  The urine looks like there was some contamination.    Make sure when you come in it has been at least 2 weeks since your last period.    I would like you to repeat the urine test and be sure to clean with the moist towelette given, then urinate a small amount of urine into the toilet and then collect urine into the cup.    If there is again blood in the sample then you may need to see a specialist to do further evaluation.    Diya Blancas MD   [Initial Evaluation] : an initial evaluation [Weight Management/Obesity] : weight management/obesity

## 2024-08-30 PROBLEM — E66.9 OBESITY, CLASS II, BMI 35-39.9: Status: ACTIVE | Noted: 2024-08-29

## 2024-08-30 LAB
ALBUMIN SERPL ELPH-MCNC: 4.6 G/DL
ALP BLD-CCNC: 115 U/L
ALT SERPL-CCNC: 24 U/L
ANION GAP SERPL CALC-SCNC: 14 MMOL/L
AST SERPL-CCNC: 21 U/L
BILIRUB SERPL-MCNC: 0.7 MG/DL
BUN SERPL-MCNC: 16 MG/DL
CALCIUM SERPL-MCNC: 9.4 MG/DL
CHLORIDE SERPL-SCNC: 103 MMOL/L
CO2 SERPL-SCNC: 24 MMOL/L
CREAT SERPL-MCNC: 0.86 MG/DL
EGFR: 106 ML/MIN/1.73M2
ESTIMATED AVERAGE GLUCOSE: 103 MG/DL
GLUCOSE SERPL-MCNC: 90 MG/DL
HBA1C MFR BLD HPLC: 5.2 %
POTASSIUM SERPL-SCNC: 4.7 MMOL/L
PROT SERPL-MCNC: 7.5 G/DL
SODIUM SERPL-SCNC: 141 MMOL/L
T4 FREE SERPL-MCNC: 1.3 NG/DL
TSH SERPL-ACNC: 1.69 UIU/ML

## 2024-08-30 NOTE — REVIEW OF SYSTEMS
[Fatigue] : fatigue [Negative] : Heme/Lymph [Nausea] : no nausea [Vomiting] : no vomiting [Polyuria] : no polyuria

## 2024-08-30 NOTE — END OF VISIT
[FreeTextEntry3] :  All medical record entries made by the Scribe were at my, Dr. Gunner Sow, direction and personally dictated by me on 08/29/2024. I have reviewed the chart and agree that the record accurately reflects my personal performance of the history, physical exam, assessment and plan. I have also personally directed, reviewed and agreed with the chart. [Time Spent: ___ minutes] : I have spent [unfilled] minutes of time on the encounter which excludes teaching and separately reported services.

## 2024-08-30 NOTE — PHYSICAL EXAM
[No Acute Distress] : no acute distress [Normal Sclera/Conjunctiva] : normal sclera/conjunctiva [No Proptosis] : no proptosis [Normal Outer Ear/Nose] : the ears and nose were normal in appearance [Normal Lips/Gums] : the lips and gums were normal [Thyroid Not Enlarged] : the thyroid was not enlarged [No Thyroid Nodules] : no palpable thyroid nodules [Clear to Auscultation] : lungs were clear to auscultation bilaterally [No Edema] : no peripheral edema [Soft] : abdomen soft [Spine Straight] : spine straight [No Stigmata of Cushings Syndrome] : no stigmata of Cushings Syndrome [Normal Gait] : normal gait [Normal Strength/Tone] : muscle strength and tone were normal [No Rash] : no rash [Normal Reflexes] : deep tendon reflexes were 2+ and symmetric [No Tremors] : no tremors [Oriented x3] : oriented to person, place, and time [Kyphosis] : no kyphosis present [Acanthosis Nigricans] : no acanthosis nigricans

## 2024-08-30 NOTE — HISTORY OF PRESENT ILLNESS
[FreeTextEntry1] : 48 year old M pt, with Hx of obesity, referred by cardiologist, presents today to establish endocrine care. Other PMHx: depression, HTN, hypogonadism, varicocele (treated ~2014), Afib, osteoarthritis, asthma, GERD, IBS PSHx: 2020 Left rotator cuff surgery FHx: Mother: Hyperthyroidism, Father: CAD, Siblings: Sister with asthma No FHx of: SHx: No smoking, no EtOH Allergy to Augmentin  08/29/2024 Pt works 5 days a week. No children.    CC: "I'm here for my weight issues. My medical diagnoses and treatments are affecting my weight. I've had weight issues my whole life, and it usually fluctuates." Pt reports hx of Afib, osteoarthritis, IVÁN, asthma, GERD, and IBS. He had taken Prozac in the past, but discontinued it. Pt states that he orders food often. He admits that he has poor eating habits and difficulty with diets. Pt has followed up with nutritionists in the past, and was uninterested to see another one.   Pt endorses tiredness. Pt denies polyuria, nausea, and vomiting.    Current Medications: Metoprolol, Diltiazem

## 2024-09-08 ENCOUNTER — NON-APPOINTMENT (OUTPATIENT)
Age: 49
End: 2024-09-08

## 2024-09-23 ENCOUNTER — TRANSCRIPTION ENCOUNTER (OUTPATIENT)
Age: 49
End: 2024-09-23

## 2024-09-23 RX ORDER — SEMAGLUTIDE 0.25 MG/.5ML
0.25 INJECTION, SOLUTION SUBCUTANEOUS
Qty: 1 | Refills: 2 | Status: ACTIVE | COMMUNITY
Start: 2024-09-23 | End: 1900-01-01

## 2024-10-07 ENCOUNTER — RX RENEWAL (OUTPATIENT)
Age: 49
End: 2024-10-07

## 2024-10-07 ENCOUNTER — NON-APPOINTMENT (OUTPATIENT)
Age: 49
End: 2024-10-07

## 2024-10-08 ENCOUNTER — NON-APPOINTMENT (OUTPATIENT)
Age: 49
End: 2024-10-08

## 2024-10-21 ENCOUNTER — TRANSCRIPTION ENCOUNTER (OUTPATIENT)
Age: 49
End: 2024-10-21

## 2024-11-08 ENCOUNTER — APPOINTMENT (OUTPATIENT)
Dept: HEART AND VASCULAR | Facility: CLINIC | Age: 49
End: 2024-11-08
Payer: COMMERCIAL

## 2024-11-08 ENCOUNTER — NON-APPOINTMENT (OUTPATIENT)
Age: 49
End: 2024-11-08

## 2024-11-08 VITALS
TEMPERATURE: 98.6 F | HEART RATE: 86 BPM | OXYGEN SATURATION: 97 % | DIASTOLIC BLOOD PRESSURE: 86 MMHG | SYSTOLIC BLOOD PRESSURE: 137 MMHG | HEIGHT: 70 IN

## 2024-11-08 DIAGNOSIS — I47.10 SUPRAVENTRICULAR TACHYCARDIA, UNSPECIFIED: ICD-10-CM

## 2024-11-08 PROCEDURE — 99214 OFFICE O/P EST MOD 30 MIN: CPT | Mod: 25

## 2024-11-08 PROCEDURE — 93000 ELECTROCARDIOGRAM COMPLETE: CPT

## 2024-11-19 ENCOUNTER — NON-APPOINTMENT (OUTPATIENT)
Age: 49
End: 2024-11-19

## 2024-11-19 ENCOUNTER — TRANSCRIPTION ENCOUNTER (OUTPATIENT)
Age: 49
End: 2024-11-19

## 2024-11-20 ENCOUNTER — RX RENEWAL (OUTPATIENT)
Age: 49
End: 2024-11-20

## 2024-11-21 ENCOUNTER — TRANSCRIPTION ENCOUNTER (OUTPATIENT)
Age: 49
End: 2024-11-21

## 2024-12-09 ENCOUNTER — RX RENEWAL (OUTPATIENT)
Age: 49
End: 2024-12-09

## 2024-12-19 ENCOUNTER — APPOINTMENT (OUTPATIENT)
Dept: ENDOCRINOLOGY | Facility: CLINIC | Age: 49
End: 2024-12-19

## 2025-01-17 ENCOUNTER — NON-APPOINTMENT (OUTPATIENT)
Age: 50
End: 2025-01-17

## 2025-01-21 ENCOUNTER — RX RENEWAL (OUTPATIENT)
Age: 50
End: 2025-01-21

## 2025-02-13 ENCOUNTER — APPOINTMENT (OUTPATIENT)
Dept: PULMONOLOGY | Facility: CLINIC | Age: 50
End: 2025-02-13
Payer: COMMERCIAL

## 2025-02-13 PROCEDURE — 94618 PULMONARY STRESS TESTING: CPT

## 2025-02-13 PROCEDURE — ZZZZZ: CPT

## 2025-02-13 PROCEDURE — 95012 NITRIC OXIDE EXP GAS DETER: CPT

## 2025-02-13 PROCEDURE — 94060 EVALUATION OF WHEEZING: CPT

## 2025-02-13 PROCEDURE — 94729 DIFFUSING CAPACITY: CPT

## 2025-02-13 PROCEDURE — 94727 GAS DIL/WSHOT DETER LNG VOL: CPT

## 2025-02-14 ENCOUNTER — NON-APPOINTMENT (OUTPATIENT)
Age: 50
End: 2025-02-14

## 2025-02-20 ENCOUNTER — NON-APPOINTMENT (OUTPATIENT)
Age: 50
End: 2025-02-20

## 2025-02-20 ENCOUNTER — APPOINTMENT (OUTPATIENT)
Dept: PULMONOLOGY | Facility: CLINIC | Age: 50
End: 2025-02-20
Payer: COMMERCIAL

## 2025-02-20 ENCOUNTER — LABORATORY RESULT (OUTPATIENT)
Age: 50
End: 2025-02-20

## 2025-02-20 VITALS
BODY MASS INDEX: 36.08 KG/M2 | OXYGEN SATURATION: 97 % | HEART RATE: 102 BPM | RESPIRATION RATE: 12 BRPM | DIASTOLIC BLOOD PRESSURE: 89 MMHG | HEIGHT: 70 IN | SYSTOLIC BLOOD PRESSURE: 133 MMHG | WEIGHT: 252 LBS | TEMPERATURE: 97 F

## 2025-02-20 DIAGNOSIS — J45.909 UNSPECIFIED ASTHMA, UNCOMPLICATED: ICD-10-CM

## 2025-02-20 DIAGNOSIS — J32.9 CHRONIC SINUSITIS, UNSPECIFIED: ICD-10-CM

## 2025-02-20 DIAGNOSIS — R06.02 SHORTNESS OF BREATH: ICD-10-CM

## 2025-02-20 DIAGNOSIS — G47.33 OBSTRUCTIVE SLEEP APNEA (ADULT) (PEDIATRIC): ICD-10-CM

## 2025-02-20 PROCEDURE — 99215 OFFICE O/P EST HI 40 MIN: CPT

## 2025-02-21 ENCOUNTER — NON-APPOINTMENT (OUTPATIENT)
Age: 50
End: 2025-02-21

## 2025-02-21 LAB — EOSINOPHIL # BLD MANUAL: 230 /UL

## 2025-02-21 RX ORDER — INHALER, ASSIST DEVICES
SPACER (EA) MISCELLANEOUS
Qty: 1 | Refills: 0 | Status: ACTIVE | COMMUNITY
Start: 2025-02-21 | End: 1900-01-01

## 2025-02-23 LAB
DEPRECATED MOUSE EPITH IGE RAST QL: 0
DEPRECATED MOUSE SERUM PROTEINS IGE AB RAST CLASS [PRESENCE] IN SERUM: 0
DEPRECATED MOUSE URINE PROT IGE RAST QL: NORMAL
MOUSE EPITH IGE QN: <0.1 KUA/L
MOUSE SERUM PROTEINS IGE AB [UNITS/VOLUME] IN SERUM: <0.1 KUA/L
MOUSE URINE PROT IGE QN: 0.11 KUA/L
TOTAL IGE SMQN RAST: 1379 KU/L

## 2025-03-03 ENCOUNTER — RX RENEWAL (OUTPATIENT)
Age: 50
End: 2025-03-03

## 2025-03-04 ENCOUNTER — OUTPATIENT (OUTPATIENT)
Dept: OUTPATIENT SERVICES | Facility: HOSPITAL | Age: 50
LOS: 1 days | End: 2025-03-04
Payer: COMMERCIAL

## 2025-03-04 ENCOUNTER — APPOINTMENT (OUTPATIENT)
Dept: SLEEP CENTER | Facility: HOSPITAL | Age: 50
End: 2025-03-04

## 2025-03-04 DIAGNOSIS — G47.33 OBSTRUCTIVE SLEEP APNEA (ADULT) (PEDIATRIC): ICD-10-CM

## 2025-03-04 PROCEDURE — 95807 SLEEP STUDY ATTENDED: CPT

## 2025-03-04 PROCEDURE — 95811 POLYSOM 6/>YRS CPAP 4/> PARM: CPT | Mod: 26,52

## 2025-03-20 ENCOUNTER — APPOINTMENT (OUTPATIENT)
Dept: PULMONOLOGY | Facility: CLINIC | Age: 50
End: 2025-03-20

## 2025-03-20 PROCEDURE — 94060 EVALUATION OF WHEEZING: CPT

## 2025-03-20 PROCEDURE — 95012 NITRIC OXIDE EXP GAS DETER: CPT

## 2025-03-28 ENCOUNTER — NON-APPOINTMENT (OUTPATIENT)
Age: 50
End: 2025-03-28

## 2025-04-25 ENCOUNTER — NON-APPOINTMENT (OUTPATIENT)
Age: 50
End: 2025-04-25

## 2025-04-25 ENCOUNTER — APPOINTMENT (OUTPATIENT)
Dept: HEART AND VASCULAR | Facility: CLINIC | Age: 50
End: 2025-04-25
Payer: COMMERCIAL

## 2025-04-25 VITALS
BODY MASS INDEX: 35.36 KG/M2 | WEIGHT: 247 LBS | HEIGHT: 70 IN | SYSTOLIC BLOOD PRESSURE: 139 MMHG | TEMPERATURE: 97.5 F | OXYGEN SATURATION: 98 % | HEART RATE: 111 BPM | DIASTOLIC BLOOD PRESSURE: 98 MMHG

## 2025-04-25 PROCEDURE — 93000 ELECTROCARDIOGRAM COMPLETE: CPT

## 2025-04-25 PROCEDURE — 99213 OFFICE O/P EST LOW 20 MIN: CPT | Mod: 25

## 2025-06-04 ENCOUNTER — NON-APPOINTMENT (OUTPATIENT)
Age: 50
End: 2025-06-04

## 2025-06-10 ENCOUNTER — APPOINTMENT (OUTPATIENT)
Dept: PULMONOLOGY | Facility: CLINIC | Age: 50
End: 2025-06-10
Payer: COMMERCIAL

## 2025-06-10 ENCOUNTER — TRANSCRIPTION ENCOUNTER (OUTPATIENT)
Age: 50
End: 2025-06-10

## 2025-06-10 VITALS
OXYGEN SATURATION: 96 % | HEART RATE: 103 BPM | TEMPERATURE: 97.9 F | BODY MASS INDEX: 35.27 KG/M2 | HEIGHT: 70 IN | WEIGHT: 246.38 LBS | DIASTOLIC BLOOD PRESSURE: 69 MMHG | RESPIRATION RATE: 12 BRPM | SYSTOLIC BLOOD PRESSURE: 105 MMHG

## 2025-06-10 DIAGNOSIS — J45.909 UNSPECIFIED ASTHMA, UNCOMPLICATED: ICD-10-CM

## 2025-06-10 DIAGNOSIS — G47.33 OBSTRUCTIVE SLEEP APNEA (ADULT) (PEDIATRIC): ICD-10-CM

## 2025-06-10 DIAGNOSIS — R06.02 SHORTNESS OF BREATH: ICD-10-CM

## 2025-06-10 PROCEDURE — 94618 PULMONARY STRESS TESTING: CPT

## 2025-06-10 PROCEDURE — 94727 GAS DIL/WSHOT DETER LNG VOL: CPT

## 2025-06-10 PROCEDURE — 99214 OFFICE O/P EST MOD 30 MIN: CPT | Mod: 25

## 2025-06-10 PROCEDURE — 94060 EVALUATION OF WHEEZING: CPT

## 2025-06-10 PROCEDURE — 94729 DIFFUSING CAPACITY: CPT

## 2025-06-10 PROCEDURE — 95012 NITRIC OXIDE EXP GAS DETER: CPT

## 2025-06-10 PROCEDURE — ZZZZZ: CPT

## 2025-06-24 ENCOUNTER — APPOINTMENT (OUTPATIENT)
Dept: PULMONOLOGY | Facility: CLINIC | Age: 50
End: 2025-06-24
Payer: COMMERCIAL

## 2025-06-24 VITALS
TEMPERATURE: 96.7 F | BODY MASS INDEX: 35.22 KG/M2 | HEIGHT: 70 IN | DIASTOLIC BLOOD PRESSURE: 82 MMHG | OXYGEN SATURATION: 96 % | WEIGHT: 246 LBS | HEART RATE: 99 BPM | SYSTOLIC BLOOD PRESSURE: 136 MMHG

## 2025-06-24 PROCEDURE — G2211 COMPLEX E/M VISIT ADD ON: CPT | Mod: NC

## 2025-06-24 PROCEDURE — 99214 OFFICE O/P EST MOD 30 MIN: CPT

## 2025-06-24 RX ORDER — TIRZEPATIDE 10 MG/.5ML
10 INJECTION, SOLUTION SUBCUTANEOUS
Qty: 4 | Refills: 0 | Status: ACTIVE | COMMUNITY
Start: 2025-06-24 | End: 1900-01-01

## 2025-06-26 ENCOUNTER — NON-APPOINTMENT (OUTPATIENT)
Age: 50
End: 2025-06-26

## 2025-07-07 ENCOUNTER — RX RENEWAL (OUTPATIENT)
Age: 50
End: 2025-07-07

## 2025-07-18 ENCOUNTER — TRANSCRIPTION ENCOUNTER (OUTPATIENT)
Age: 50
End: 2025-07-18

## 2025-08-01 ENCOUNTER — TRANSCRIPTION ENCOUNTER (OUTPATIENT)
Age: 50
End: 2025-08-01

## 2025-08-01 ENCOUNTER — APPOINTMENT (OUTPATIENT)
Dept: HEART AND VASCULAR | Facility: CLINIC | Age: 50
End: 2025-08-01

## 2025-08-07 ENCOUNTER — NON-APPOINTMENT (OUTPATIENT)
Age: 50
End: 2025-08-07

## 2025-08-12 ENCOUNTER — APPOINTMENT (OUTPATIENT)
Dept: PULMONOLOGY | Facility: CLINIC | Age: 50
End: 2025-08-12

## 2025-09-09 ENCOUNTER — APPOINTMENT (OUTPATIENT)
Dept: UROLOGY | Facility: CLINIC | Age: 50
End: 2025-09-09
Payer: COMMERCIAL

## 2025-09-09 VITALS
HEIGHT: 70 IN | DIASTOLIC BLOOD PRESSURE: 77 MMHG | WEIGHT: 246 LBS | HEART RATE: 98 BPM | SYSTOLIC BLOOD PRESSURE: 123 MMHG | TEMPERATURE: 96 F | BODY MASS INDEX: 35.22 KG/M2

## 2025-09-09 DIAGNOSIS — E29.1 TESTICULAR HYPOFUNCTION: ICD-10-CM

## 2025-09-09 DIAGNOSIS — N52.9 MALE ERECTILE DYSFUNCTION, UNSPECIFIED: ICD-10-CM

## 2025-09-09 DIAGNOSIS — N40.0 BENIGN PROSTATIC HYPERPLASIA WITHOUT LOWER URINARY TRACT SYMPMS: ICD-10-CM

## 2025-09-09 PROCEDURE — 51798 US URINE CAPACITY MEASURE: CPT

## 2025-09-09 PROCEDURE — 99214 OFFICE O/P EST MOD 30 MIN: CPT

## 2025-09-09 PROCEDURE — G2211 COMPLEX E/M VISIT ADD ON: CPT | Mod: NC

## 2025-09-10 ENCOUNTER — TRANSCRIPTION ENCOUNTER (OUTPATIENT)
Age: 50
End: 2025-09-10

## 2025-09-10 LAB
ESTRADIOL SERPL-MCNC: 17 PG/ML
ESTRADIOL SERPL-MCNC: 22 PG/ML
LH SERPL-ACNC: 5.3 IU/L
LH SERPL-ACNC: 5.6 IU/L
PSA FREE FLD-MCNC: 45 %
PSA FREE SERPL-MCNC: 0.63 NG/ML
PSA SERPL-MCNC: 1.4 NG/ML
TESTOST SERPL-MCNC: 326 NG/DL
TESTOST SERPL-MCNC: 331 NG/DL

## 2025-09-12 LAB
APPEARANCE: CLEAR
BACTERIA UR CULT: NORMAL
BACTERIA: NEGATIVE /HPF
BILIRUBIN URINE: NEGATIVE
BLOOD URINE: NEGATIVE
CAST: 0 /LPF
COLOR: YELLOW
EPITHELIAL CELLS: 1 /HPF
GLUCOSE QUALITATIVE U: NEGATIVE MG/DL
KETONES URINE: NEGATIVE MG/DL
LEUKOCYTE ESTERASE URINE: NEGATIVE
MICROSCOPIC-UA: NORMAL
NITRITE URINE: NEGATIVE
PH URINE: 5.5
PROTEIN URINE: NEGATIVE MG/DL
RED BLOOD CELLS URINE: 1 /HPF
SPECIFIC GRAVITY URINE: 1.02
UROBILINOGEN URINE: 0.2 MG/DL
WHITE BLOOD CELLS URINE: 0 /HPF

## 2025-09-20 ENCOUNTER — NON-APPOINTMENT (OUTPATIENT)
Age: 50
End: 2025-09-20